# Patient Record
Sex: FEMALE | Race: WHITE | NOT HISPANIC OR LATINO | Employment: OTHER | URBAN - METROPOLITAN AREA
[De-identification: names, ages, dates, MRNs, and addresses within clinical notes are randomized per-mention and may not be internally consistent; named-entity substitution may affect disease eponyms.]

---

## 2018-08-10 ENCOUNTER — APPOINTMENT (OUTPATIENT)
Dept: SLEEP MEDICINE | Facility: HOSPITAL | Age: 68
End: 2018-08-10
Attending: INTERNAL MEDICINE

## 2018-08-21 ENCOUNTER — APPOINTMENT (OUTPATIENT)
Dept: SLEEP MEDICINE | Facility: HOSPITAL | Age: 68
End: 2018-08-21
Attending: INTERNAL MEDICINE

## 2021-08-25 ENCOUNTER — TRANSCRIBE ORDERS (OUTPATIENT)
Dept: ADMINISTRATIVE | Facility: HOSPITAL | Age: 71
End: 2021-08-25

## 2021-08-25 DIAGNOSIS — R10.9 ABDOMINAL PAIN, UNSPECIFIED ABDOMINAL LOCATION: Primary | ICD-10-CM

## 2021-08-31 ENCOUNTER — HOSPITAL ENCOUNTER (OUTPATIENT)
Dept: NUCLEAR MEDICINE | Facility: HOSPITAL | Age: 71
Discharge: HOME | End: 2021-08-31

## 2021-08-31 DIAGNOSIS — R10.9 ABDOMINAL PAIN, UNSPECIFIED ABDOMINAL LOCATION: ICD-10-CM

## 2021-08-31 PROCEDURE — 0 TECHNETIUM TC 99M MEBROFENIN KIT: Performed by: NURSE PRACTITIONER

## 2021-08-31 PROCEDURE — 78227 HEPATOBIL SYST IMAGE W/DRUG: CPT

## 2021-08-31 PROCEDURE — A9537 TC99M MEBROFENIN: HCPCS | Performed by: NURSE PRACTITIONER

## 2021-08-31 PROCEDURE — 25010000002 SINCALIDE PER 5 MCG: Performed by: NURSE PRACTITIONER

## 2021-08-31 RX ORDER — KIT FOR THE PREPARATION OF TECHNETIUM TC 99M MEBROFENIN 45 MG/10ML
1 INJECTION, POWDER, LYOPHILIZED, FOR SOLUTION INTRAVENOUS
Status: COMPLETED | OUTPATIENT
Start: 2021-08-31 | End: 2021-08-31

## 2021-08-31 RX ADMIN — SINCALIDE 2.4 MCG: 5 INJECTION, POWDER, LYOPHILIZED, FOR SOLUTION INTRAVENOUS at 13:35

## 2021-08-31 RX ADMIN — MEBROFENIN 1 DOSE: 45 INJECTION, POWDER, LYOPHILIZED, FOR SOLUTION INTRAVENOUS at 13:02

## 2022-08-29 ENCOUNTER — TRANSCRIBE ORDERS (OUTPATIENT)
Dept: ADMINISTRATIVE | Facility: HOSPITAL | Age: 72
End: 2022-08-29

## 2022-08-29 DIAGNOSIS — E11.9 DIABETES MELLITUS WITHOUT COMPLICATION: Primary | ICD-10-CM

## 2022-09-06 ENCOUNTER — TRANSCRIBE ORDERS (OUTPATIENT)
Dept: NUTRITION | Facility: HOSPITAL | Age: 72
End: 2022-09-06

## 2022-09-06 DIAGNOSIS — E11.9 DIABETES MELLITUS WITHOUT COMPLICATION: Primary | ICD-10-CM

## 2022-09-15 ENCOUNTER — APPOINTMENT (OUTPATIENT)
Dept: NUTRITION | Facility: HOSPITAL | Age: 72
End: 2022-09-15

## 2022-12-08 ENCOUNTER — OFFICE VISIT (OUTPATIENT)
Dept: INTERNAL MEDICINE | Facility: CLINIC | Age: 72
End: 2022-12-08

## 2022-12-08 VITALS
OXYGEN SATURATION: 98 % | DIASTOLIC BLOOD PRESSURE: 96 MMHG | TEMPERATURE: 97.8 F | HEART RATE: 54 BPM | BODY MASS INDEX: 40.21 KG/M2 | SYSTOLIC BLOOD PRESSURE: 144 MMHG | HEIGHT: 66 IN | WEIGHT: 250.2 LBS

## 2022-12-08 DIAGNOSIS — E03.9 ACQUIRED HYPOTHYROIDISM: ICD-10-CM

## 2022-12-08 DIAGNOSIS — R30.0 DYSURIA: ICD-10-CM

## 2022-12-08 DIAGNOSIS — E78.2 MIXED HYPERLIPIDEMIA: ICD-10-CM

## 2022-12-08 DIAGNOSIS — J30.9 ALLERGIC RHINITIS, UNSPECIFIED SEASONALITY, UNSPECIFIED TRIGGER: ICD-10-CM

## 2022-12-08 DIAGNOSIS — E66.01 CLASS 3 SEVERE OBESITY WITH SERIOUS COMORBIDITY AND BODY MASS INDEX (BMI) OF 40.0 TO 44.9 IN ADULT, UNSPECIFIED OBESITY TYPE: Primary | ICD-10-CM

## 2022-12-08 DIAGNOSIS — R79.9 ABNORMAL FINDING OF BLOOD CHEMISTRY, UNSPECIFIED: ICD-10-CM

## 2022-12-08 DIAGNOSIS — L57.0 ACTINIC KERATOSIS: ICD-10-CM

## 2022-12-08 DIAGNOSIS — I10 PRIMARY HYPERTENSION: ICD-10-CM

## 2022-12-08 DIAGNOSIS — R06.02 SHORTNESS OF BREATH: ICD-10-CM

## 2022-12-08 DIAGNOSIS — M1A.9XX0 CHRONIC GOUT INVOLVING TOE WITHOUT TOPHUS, UNSPECIFIED CAUSE, UNSPECIFIED LATERALITY: ICD-10-CM

## 2022-12-08 PROCEDURE — 99204 OFFICE O/P NEW MOD 45 MIN: CPT | Performed by: INTERNAL MEDICINE

## 2022-12-08 RX ORDER — FLUTICASONE PROPIONATE 50 MCG
1 SPRAY, SUSPENSION (ML) NASAL DAILY
Qty: 15.8 ML | Refills: 3 | Status: SHIPPED | OUTPATIENT
Start: 2022-12-08

## 2022-12-08 RX ORDER — FLUTICASONE PROPIONATE 50 MCG
SPRAY, SUSPENSION (ML) NASAL
COMMUNITY
Start: 2022-12-07 | End: 2022-12-08 | Stop reason: SDUPTHER

## 2022-12-08 NOTE — PROGRESS NOTES
"Chief Complaint  Establish Care (Just finished antibiotics for asthma. Would like to do blood panel and start fresh with medications. She is fasting for labs today. )    Subjective        Judith Tenorio presents to [facility]  History of Present Illness     Ms. Tenorio is a nellie 72-year-old female who presents to establish care and discuss concerns about gout, diabetes, COPD diagnosis.  She comes in with her son today who I also have the pleasure of seeing in clinic.  He helps provide history throughout the visit.    Has OA and gout.  Green mold.  49 yo was diagnosed with asthma and got put on a bunch of allergy meds.      Last A1c I can see was 5.6% in 2020.    Restoration--does not want blood transfusions.      Abdominal pathology and concerns: Bladder tack surgery in the past, Diverticulitis diagnosis, symptoms of prolapse of uterus noted today    Objective   Vital Signs:  /96 (BP Location: Left arm)   Pulse 54   Temp 97.8 °F (36.6 °C) (Infrared)   Ht 166.4 cm (65.5\")   Wt 113 kg (250 lb 3.2 oz)   SpO2 98%   BMI 41.00 kg/m²   Estimated body mass index is 41 kg/m² as calculated from the following:    Height as of this encounter: 166.4 cm (65.5\").    Weight as of this encounter: 113 kg (250 lb 3.2 oz).    Class 3 Severe Obesity (BMI >=40). Obesity-related health conditions include the following: hypertension, diabetes mellitus and dyslipidemias. Obesity is unchanged. BMI is is above average; BMI management plan is completed. We discussed portion control, increasing exercise and pharmacologic options including ozempic .      Physical Exam  Vitals reviewed.   Constitutional:       General: She is not in acute distress.     Appearance: Normal appearance.   HENT:      Head: Normocephalic and atraumatic.      Nose: Nose normal.      Mouth/Throat:      Mouth: Mucous membranes are moist.   Eyes:      Conjunctiva/sclera: Conjunctivae normal.   Cardiovascular:     "  Rate and Rhythm: Normal rate and regular rhythm.      Pulses: Normal pulses.      Heart sounds: Normal heart sounds.   Pulmonary:      Effort: Pulmonary effort is normal.      Breath sounds: Normal breath sounds.   Abdominal:      Palpations: Abdomen is soft.      Tenderness: There is no abdominal tenderness.   Musculoskeletal:      Right lower leg: Edema present.      Left lower leg: Edema present.   Skin:     General: Skin is warm and dry.   Neurological:      General: No focal deficit present.      Mental Status: She is alert.   Psychiatric:         Mood and Affect: Mood normal.        Result Review :  The following data was reviewed by: Hayde Bingham MD on 12/08/2022:  Common labs    Common Labs 12/8/22 12/8/22 12/8/22 12/8/22 12/8/22    1419 1419 1419 1419 1419   Glucose 134 (A)       BUN 22       Creatinine 0.94       Sodium 139       Potassium 4.4       Chloride 104       Calcium 9.3       Total Protein 6.4       Albumin 4.20       Total Bilirubin 0.4       Alkaline Phosphatase 113       AST (SGOT) 24       ALT (SGPT) 15       WBC    6.86    Hemoglobin    13.5    Hematocrit    39.1    Platelets    167    Total Cholesterol  227 (A)      Triglycerides  150      HDL Cholesterol  54      LDL Cholesterol   146 (A)      Hemoglobin A1C   6.30 (A)     Uric Acid     4.8   (A) Abnormal value       Comments are available for some flowsheets but are not being displayed.           Data reviewed: reviewed labs from 2020 and 2021 in [facility] as well as most recent [facility] note.          Assessment and Plan   Diagnoses and all orders for this visit:    1. Class 3 severe obesity with serious comorbidity and body mass index (BMI) of 40.0 to 44.9 in adult, unspecified obesity type (HCC) (Primary)  Assessment & Plan:  Struggling pretty significantly with weight in recent years.  Interested in options that might be available.  We talked a little bit about dietary changes and movement changes she could make today as well as  the possibility of Ozempic given that she is diabetic.    Orders:  -     Comprehensive Metabolic Panel  -     Hemoglobin A1c  -     CBC & Differential  -     T4, Free  -     TSH    2. Mixed hyperlipidemia  Assessment & Plan:  Previous history of mixed hyperlipidemia.  Check lipids today for cardiovascular risk screening.    Orders:  -     Lipid Panel With LDL / HDL Ratio  -     CBC & Differential    3. Primary hypertension  Assessment & Plan:  Blood pressure is borderline today and family would like to track at home before we initiate any kind of new agent.  I will follow-up with them the next time we see her and continue to follow her blood pressure closely.    Orders:  -     CBC & Differential  -     T4, Free  -     TSH    4. Abnormal finding of blood chemistry, unspecified  -     Hemoglobin A1c    5. Acquired hypothyroidism  Assessment & Plan:  Sounds to be well controlled lab testing today.      6. Actinic keratosis  Assessment & Plan:  Referred to dermatology today for treatment and body scan    Orders:  -     Ambulatory Referral to Dermatology    7. Allergic rhinitis, unspecified seasonality, unspecified trigger  Assessment & Plan:  Seems to have allergic rhinitis symptoms.  Trial of Flonase sent to pharmacy.    Orders:  -     fluticasone (FLONASE) 50 MCG/ACT nasal spray; 1 spray into the nostril(s) as directed by provider Daily.  Dispense: 15.8 mL; Refill: 3    8. Chronic gout involving toe without tophus, unspecified cause, unspecified laterality  Assessment & Plan:  Her son is also concerned about missed diagnosis of gout.  It is difficult for me to tell at this time as I only have 1 uric acid in the system and it is 6.2 from several years ago.  She and her son believe that she was not on any therapy at that time.  It does sound as though she has gouty attacks and she has managed on colchicine with allopurinol 100 mg.  I think it is reasonable to take her up to 300 mg as it looks like her kidney function  has been stable.  She hopefully could come off colchicine with this regimen change.    Orders:  -     Uric Acid    9. Shortness of breath  Assessment & Plan:  Her son has multiple concerns today that perhaps she was not diagnosed appropriately in the past.  She has frequent shortness of breath and it sounds as though this is something she has dealt with her entire life, but that she had significant worsening around age 48 years old.  Around that time she was diagnosed with COPD, but has never smoked herself.  She does have some secondhand smoke exposure history, but is difficult to determine at this time if it would be enough to give her COPD.  PFTs and chest x-ray ordered today for initial evaluation.    Orders:  -     Pulmonary Function Test  -     XR Chest PA & Lateral    10. Dysuria  -     POC Urinalysis Dipstick, Automated    Other orders  -     Uric Acid           Follow Up   Return in about 1 month (around 1/8/2023) for f/u T2DM, HTN, HLD, OA.  Patient was given instructions and counseling regarding her condition or for health maintenance advice. Please see specific information pulled into the AVS if appropriate.

## 2022-12-09 LAB
ALBUMIN SERPL-MCNC: 4.2 G/DL (ref 3.5–5.2)
ALBUMIN/GLOB SERPL: 1.9 G/DL
ALP SERPL-CCNC: 113 U/L (ref 39–117)
ALT SERPL-CCNC: 15 U/L (ref 1–33)
AST SERPL-CCNC: 24 U/L (ref 1–32)
BASOPHILS # BLD AUTO: 0.07 10*3/MM3 (ref 0–0.2)
BASOPHILS NFR BLD AUTO: 1 % (ref 0–1.5)
BILIRUB SERPL-MCNC: 0.4 MG/DL (ref 0–1.2)
BUN SERPL-MCNC: 22 MG/DL (ref 8–23)
BUN/CREAT SERPL: 23.4 (ref 7–25)
CALCIUM SERPL-MCNC: 9.3 MG/DL (ref 8.6–10.5)
CHLORIDE SERPL-SCNC: 104 MMOL/L (ref 98–107)
CHOLEST SERPL-MCNC: 227 MG/DL (ref 0–200)
CO2 SERPL-SCNC: 25 MMOL/L (ref 22–29)
CREAT SERPL-MCNC: 0.94 MG/DL (ref 0.57–1)
EGFRCR SERPLBLD CKD-EPI 2021: 63.8 ML/MIN/1.73
EOSINOPHIL # BLD AUTO: 0.27 10*3/MM3 (ref 0–0.4)
EOSINOPHIL NFR BLD AUTO: 3.9 % (ref 0.3–6.2)
ERYTHROCYTE [DISTWIDTH] IN BLOOD BY AUTOMATED COUNT: 13.9 % (ref 12.3–15.4)
GLOBULIN SER CALC-MCNC: 2.2 GM/DL
GLUCOSE SERPL-MCNC: 134 MG/DL (ref 65–99)
HBA1C MFR BLD: 6.3 % (ref 4.8–5.6)
HCT VFR BLD AUTO: 39.1 % (ref 34–46.6)
HDLC SERPL-MCNC: 54 MG/DL (ref 40–60)
HGB BLD-MCNC: 13.5 G/DL (ref 12–15.9)
IMM GRANULOCYTES # BLD AUTO: 0.04 10*3/MM3 (ref 0–0.05)
IMM GRANULOCYTES NFR BLD AUTO: 0.6 % (ref 0–0.5)
LDLC SERPL CALC-MCNC: 146 MG/DL (ref 0–100)
LDLC/HDLC SERPL: 2.65 {RATIO}
LYMPHOCYTES # BLD AUTO: 1.61 10*3/MM3 (ref 0.7–3.1)
LYMPHOCYTES NFR BLD AUTO: 23.5 % (ref 19.6–45.3)
MCH RBC QN AUTO: 31.9 PG (ref 26.6–33)
MCHC RBC AUTO-ENTMCNC: 34.5 G/DL (ref 31.5–35.7)
MCV RBC AUTO: 92.4 FL (ref 79–97)
MONOCYTES # BLD AUTO: 0.61 10*3/MM3 (ref 0.1–0.9)
MONOCYTES NFR BLD AUTO: 8.9 % (ref 5–12)
NEUTROPHILS # BLD AUTO: 4.26 10*3/MM3 (ref 1.7–7)
NEUTROPHILS NFR BLD AUTO: 62.1 % (ref 42.7–76)
NRBC BLD AUTO-RTO: 0 /100 WBC (ref 0–0.2)
PLATELET # BLD AUTO: 167 10*3/MM3 (ref 140–450)
POTASSIUM SERPL-SCNC: 4.4 MMOL/L (ref 3.5–5.2)
PROT SERPL-MCNC: 6.4 G/DL (ref 6–8.5)
RBC # BLD AUTO: 4.23 10*6/MM3 (ref 3.77–5.28)
SODIUM SERPL-SCNC: 139 MMOL/L (ref 136–145)
T4 FREE SERPL-MCNC: 0.95 NG/DL (ref 0.93–1.7)
TRIGL SERPL-MCNC: 150 MG/DL (ref 0–150)
TSH SERPL DL<=0.005 MIU/L-ACNC: 5.9 UIU/ML (ref 0.27–4.2)
URATE SERPL-MCNC: 4.8 MG/DL (ref 2.4–5.7)
VLDLC SERPL CALC-MCNC: 27 MG/DL (ref 5–40)
WBC # BLD AUTO: 6.86 10*3/MM3 (ref 3.4–10.8)

## 2022-12-12 PROBLEM — E66.813 CLASS 3 SEVERE OBESITY WITH SERIOUS COMORBIDITY AND BODY MASS INDEX (BMI) OF 40.0 TO 44.9 IN ADULT: Status: ACTIVE | Noted: 2022-12-12

## 2022-12-12 PROBLEM — E03.9 ACQUIRED HYPOTHYROIDISM: Status: ACTIVE | Noted: 2022-12-12

## 2022-12-12 PROBLEM — J30.9 ALLERGIC RHINITIS: Status: ACTIVE | Noted: 2022-12-12

## 2022-12-12 PROBLEM — L57.0 ACTINIC KERATOSIS: Status: ACTIVE | Noted: 2022-12-12

## 2022-12-12 PROBLEM — I10 PRIMARY HYPERTENSION: Status: ACTIVE | Noted: 2022-12-12

## 2022-12-12 PROBLEM — R30.0 DYSURIA: Status: ACTIVE | Noted: 2022-12-12

## 2022-12-12 PROBLEM — E66.01 CLASS 3 SEVERE OBESITY WITH SERIOUS COMORBIDITY AND BODY MASS INDEX (BMI) OF 40.0 TO 44.9 IN ADULT: Status: ACTIVE | Noted: 2022-12-12

## 2022-12-12 PROBLEM — E78.2 MIXED HYPERLIPIDEMIA: Status: ACTIVE | Noted: 2022-12-12

## 2022-12-12 PROBLEM — R06.02 SHORTNESS OF BREATH: Status: ACTIVE | Noted: 2022-12-12

## 2022-12-12 PROBLEM — M1A.9XX0 CHRONIC GOUT INVOLVING TOE WITHOUT TOPHUS: Status: ACTIVE | Noted: 2022-12-12

## 2022-12-12 NOTE — ASSESSMENT & PLAN NOTE
Blood pressure is borderline today and family would like to track at home before we initiate any kind of new agent.  I will follow-up with them the next time we see her and continue to follow her blood pressure closely.

## 2022-12-12 NOTE — ASSESSMENT & PLAN NOTE
Struggling pretty significantly with weight in recent years.  Interested in options that might be available.  We talked a little bit about dietary changes and movement changes she could make today as well as the possibility of Ozempic given that she is diabetic.

## 2022-12-12 NOTE — ASSESSMENT & PLAN NOTE
Her son is also concerned about missed diagnosis of gout.  It is difficult for me to tell at this time as I only have 1 uric acid in the system and it is 6.2 from several years ago.  She and her son believe that she was not on any therapy at that time.  It does sound as though she has gouty attacks and she has managed on colchicine with allopurinol 100 mg.  I think it is reasonable to take her up to 300 mg as it looks like her kidney function has been stable.  She hopefully could come off colchicine with this regimen change.

## 2022-12-21 ENCOUNTER — HOSPITAL ENCOUNTER (OUTPATIENT)
Dept: PULMONOLOGY | Facility: HOSPITAL | Age: 72
Discharge: HOME | End: 2022-12-21
Admitting: INTERNAL MEDICINE

## 2022-12-21 VITALS — OXYGEN SATURATION: 97 % | RESPIRATION RATE: 20 BRPM | HEART RATE: 74 BPM

## 2022-12-21 PROCEDURE — 94729 DIFFUSING CAPACITY: CPT

## 2022-12-21 PROCEDURE — 63710000001 ALBUTEROL SULFATE HFA 108 (90 BASE) MCG/ACT AEROSOL SOLUTION 8 G INHALER: Performed by: INTERNAL MEDICINE

## 2022-12-21 PROCEDURE — 94726 PLETHYSMOGRAPHY LUNG VOLUMES: CPT

## 2022-12-21 PROCEDURE — 94060 EVALUATION OF WHEEZING: CPT

## 2022-12-21 PROCEDURE — A9270 NON-COVERED ITEM OR SERVICE: HCPCS | Performed by: INTERNAL MEDICINE

## 2022-12-21 RX ORDER — ALBUTEROL SULFATE 90 UG/1
4 AEROSOL, METERED RESPIRATORY (INHALATION) ONCE
Status: COMPLETED | OUTPATIENT
Start: 2022-12-21 | End: 2022-12-21

## 2022-12-21 RX ADMIN — ALBUTEROL SULFATE 4 PUFF: 90 AEROSOL, METERED RESPIRATORY (INHALATION) at 14:04

## 2022-12-23 NOTE — PROGRESS NOTES
Tried to call patient to discuss this but was unable to reach and just sent the patient a message via [employer] about getting scheduled.

## 2022-12-29 ENCOUNTER — OFFICE VISIT (OUTPATIENT)
Dept: INTERNAL MEDICINE | Facility: CLINIC | Age: 72
End: 2022-12-29
Payer: MEDICARE

## 2022-12-29 ENCOUNTER — HOSPITAL ENCOUNTER (OUTPATIENT)
Dept: GENERAL RADIOLOGY | Facility: HOSPITAL | Age: 72
Discharge: HOME | End: 2022-12-29
Admitting: INTERNAL MEDICINE

## 2022-12-29 VITALS
HEIGHT: 66 IN | DIASTOLIC BLOOD PRESSURE: 82 MMHG | OXYGEN SATURATION: 98 % | SYSTOLIC BLOOD PRESSURE: 140 MMHG | BODY MASS INDEX: 40.02 KG/M2 | TEMPERATURE: 98 F | HEART RATE: 87 BPM | WEIGHT: 249 LBS

## 2022-12-29 DIAGNOSIS — N39.498 OTHER URINARY INCONTINENCE: Primary | ICD-10-CM

## 2022-12-29 DIAGNOSIS — R06.02 SHORTNESS OF BREATH: ICD-10-CM

## 2022-12-29 DIAGNOSIS — E78.2 MIXED HYPERLIPIDEMIA: ICD-10-CM

## 2022-12-29 DIAGNOSIS — E03.9 ACQUIRED HYPOTHYROIDISM: ICD-10-CM

## 2022-12-29 LAB
BILIRUB BLD-MCNC: ABNORMAL MG/DL
CLARITY, POC: CLEAR
COLOR UR: ABNORMAL
EXPIRATION DATE: ABNORMAL
GLUCOSE UR STRIP-MCNC: NEGATIVE MG/DL
KETONES UR QL: ABNORMAL
LEUKOCYTE EST, POC: NEGATIVE
Lab: ABNORMAL
NITRITE UR-MCNC: NEGATIVE MG/ML
PH UR: 5.5 [PH] (ref 5–8)
PROT UR STRIP-MCNC: NEGATIVE MG/DL
RBC # UR STRIP: NEGATIVE /UL
SP GR UR: 1.02 (ref 1–1.03)
UROBILINOGEN UR QL: NORMAL

## 2022-12-29 PROCEDURE — 71046 X-RAY EXAM CHEST 2 VIEWS: CPT

## 2022-12-29 PROCEDURE — 1160F RVW MEDS BY RX/DR IN RCRD: CPT | Performed by: INTERNAL MEDICINE

## 2022-12-29 PROCEDURE — 81003 URINALYSIS AUTO W/O SCOPE: CPT | Performed by: INTERNAL MEDICINE

## 2022-12-29 PROCEDURE — 1159F MED LIST DOCD IN RCRD: CPT | Performed by: INTERNAL MEDICINE

## 2022-12-29 PROCEDURE — 99214 OFFICE O/P EST MOD 30 MIN: CPT | Performed by: INTERNAL MEDICINE

## 2022-12-29 RX ORDER — BLOOD SUGAR DIAGNOSTIC
STRIP MISCELLANEOUS
COMMUNITY
Start: 2022-12-28

## 2022-12-29 NOTE — PROGRESS NOTES
Judith Tenorio is a 72 y.o. female who presents with a chief complaint of   Chief Complaint   Patient presents with   • Results     Discuss test results, still has a very bad cough that is worse at night       HPI     F/u today to discuss results and make a plan going forward.     Thyroid was a little abnormal, but she hasn't been taking her Levothyroxine.  Will try to get back on her Levothyroxine.     Has some white matter disease in brain from prior MRI.  Let me know today while we were discussing labs.     The following portions of the patient's history were reviewed and updated as appropriate: allergies, current medications, past family history, past medical history, past social history, past surgical history and problem list.      Current Outpatient Medications:   •  allopurinol (ZYLOPRIM) 300 MG tablet, Take 300 mg by mouth Daily., Disp: , Rfl:   •  cetirizine (zyrTEC) 10 MG tablet, Take 10 mg by mouth Daily., Disp: , Rfl:   •  fluticasone (FLONASE) 50 MCG/ACT nasal spray, 1 spray into the nostril(s) as directed by provider Daily., Disp: 15.8 mL, Rfl: 3  •  metFORMIN (GLUCOPHAGE) 500 MG tablet, Take 1 tablet by mouth Every 12 (Twelve) Hours., Disp: , Rfl:   •  montelukast (SINGULAIR) 10 MG tablet, Take 1 tablet by mouth Daily., Disp: , Rfl:   •  Symbicort 160-4.5 MCG/ACT inhaler, Inhale 1 puff 2 (Two) Times a Day., Disp: , Rfl:   •  Ventolin  (90 Base) MCG/ACT inhaler, Inhale 1 puff As Needed., Disp: , Rfl:   •  Accu-Chek Guide test strip, , Disp: , Rfl:   •  colchicine 0.6 MG tablet, Take 1 tablet by mouth 2 (Two) Times a Day., Disp: , Rfl:             Physical Exam  /82 (BP Location: Left arm)   Pulse 87   Temp 98 °F (36.7 °C) (Infrared)   Ht 166.4 cm (65.5\")   Wt 113 kg (249 lb)   SpO2 98%   BMI 40.81 kg/m²     Physical Exam  Vitals reviewed.   Constitutional:       General: She is not in acute distress.     Appearance: Normal appearance.   HENT:      Head: Normocephalic  and atraumatic.      Nose: Nose normal.      Mouth/Throat:      Mouth: Mucous membranes are moist.   Eyes:      Conjunctiva/sclera: Conjunctivae normal.   Cardiovascular:      Rate and Rhythm: Normal rate and regular rhythm.      Pulses: Normal pulses.      Heart sounds: Normal heart sounds.   Pulmonary:      Effort: Pulmonary effort is normal.      Breath sounds: Normal breath sounds.   Abdominal:      Palpations: Abdomen is soft.      Tenderness: There is no abdominal tenderness.   Skin:     General: Skin is warm and dry.   Neurological:      General: No focal deficit present.      Mental Status: She is alert.   Psychiatric:         Mood and Affect: Mood normal.           Results for orders placed or performed in visit on 12/29/22   POC Urinalysis Dipstick, Automated    Specimen: Urine   Result Value Ref Range    Color Orange (A) Yellow, Straw, Dark Yellow, Adina    Clarity, UA Clear Clear    Specific Gravity  1.020 1.005 - 1.030    pH, Urine 5.5 5.0 - 8.0    Leukocytes Negative Negative    Nitrite, UA Negative Negative    Protein, POC Negative Negative mg/dL    Glucose, UA Negative Negative mg/dL    Ketones, UA Trace (A) Negative    Urobilinogen, UA Normal Normal, 0.2 E.U./dL    Bilirubin Small (1+) (A) Negative    Blood, UA Negative Negative    Lot Number [id number]     Expiration Date 9/30/23            Diagnoses and all orders for this visit:    1. Other urinary incontinence (Primary)  Assessment & Plan:  - POCT u/a today as she feels symptoms are a little worse    Orders:  -     POC Urinalysis Dipstick, Automated    2. Shortness of breath  Assessment & Plan:  - Reviewed PFTs together and explained DLCO  - Shared decision making to do echo next and re-assess from there    Orders:  -     Adult Transthoracic Echo Complete W/ Cont if Necessary Per Protocol    3. Mixed hyperlipidemia  Assessment & Plan:  - LDL is elevated, but I think it is reasonable to work on diet before starting a statin at this time.  She is  opposed to a statin at this time as well       4. Acquired hypothyroidism  Assessment & Plan:  - TSH mildly elevated, but when discussed, stated she has struggled to consistently take her synthroid, but will re-start taking it.  She says she has pills at home.       F/u after Echo completed.

## 2023-01-03 PROBLEM — R32 ABSENCE OF BLADDER CONTINENCE: Status: ACTIVE | Noted: 2023-01-03

## 2023-01-03 NOTE — ASSESSMENT & PLAN NOTE
- Reviewed PFTs together and explained DLCO  - Shared decision making to do echo next and re-assess from there

## 2023-01-03 NOTE — ASSESSMENT & PLAN NOTE
- LDL is elevated, but I think it is reasonable to work on diet before starting a statin at this time.  She is opposed to a statin at this time as well

## 2023-01-03 NOTE — ASSESSMENT & PLAN NOTE
- TSH mildly elevated, but when discussed, stated she has struggled to consistently take her synthroid, but will re-start taking it.  She says she has pills at home.

## 2023-01-04 ENCOUNTER — TELEMEDICINE (OUTPATIENT)
Dept: INTERNAL MEDICINE | Facility: CLINIC | Age: 73
End: 2023-01-04
Payer: MEDICARE

## 2023-01-04 VITALS — BODY MASS INDEX: 41.62 KG/M2 | HEIGHT: 66 IN | WEIGHT: 259 LBS

## 2023-01-04 DIAGNOSIS — R05.2 SUBACUTE COUGH: Primary | ICD-10-CM

## 2023-01-04 PROCEDURE — 1159F MED LIST DOCD IN RCRD: CPT | Performed by: INTERNAL MEDICINE

## 2023-01-04 PROCEDURE — 1160F RVW MEDS BY RX/DR IN RCRD: CPT | Performed by: INTERNAL MEDICINE

## 2023-01-04 PROCEDURE — 99213 OFFICE O/P EST LOW 20 MIN: CPT | Performed by: INTERNAL MEDICINE

## 2023-01-04 RX ORDER — FUROSEMIDE 20 MG/1
20 TABLET ORAL DAILY
Qty: 30 TABLET | Refills: 0 | Status: SHIPPED | OUTPATIENT
Start: 2023-01-04 | End: 2023-01-12

## 2023-01-04 RX ORDER — BENZONATATE 100 MG/1
100 CAPSULE ORAL 3 TIMES DAILY PRN
Qty: 42 CAPSULE | Refills: 0 | Status: SHIPPED | OUTPATIENT
Start: 2023-01-04 | End: 2023-01-18

## 2023-01-04 NOTE — ASSESSMENT & PLAN NOTE
Has been to [facility] and tried lots of OTC treatments.  CXR more consistent with volume.  Echo pending, but symptoms seem concerning for volume overload without real infectious signs.  PFTs more c/w decreased DLCO and no obstructive FEV1 or flow volume loops.  Will go ahead and treat with lasix 20 mg daily to try and pull some fluid off.  Return precautions discussed.  Echo pending on 1/10 will f/u.

## 2023-01-04 NOTE — PROGRESS NOTES
Judith Tenorio is a 72 y.o. female who presents with a chief complaint of   Chief Complaint   Patient presents with   • Cough     Has been coughing for over 2 months, has been coughing so much her stomach hurts. She says that she is coughing so much she is choking herself        HPI     2 months of just coughing so much that her stomach is sore and she is having trouble breathing.  Sputum is clear mucous.  Feels like it has been persistent and she is just so sore from coughing and getting choked. This is keeping her up at night.      The following portions of the patient's history were reviewed and updated as appropriate: allergies, current medications, past family history, past medical history, past social history, past surgical history and problem list.      Current Outpatient Medications:   •  allopurinol (ZYLOPRIM) 300 MG tablet, Take 300 mg by mouth Daily., Disp: , Rfl:   •  cetirizine (zyrTEC) 10 MG tablet, Take 10 mg by mouth Daily., Disp: , Rfl:   •  fluticasone (FLONASE) 50 MCG/ACT nasal spray, 1 spray into the nostril(s) as directed by provider Daily., Disp: 15.8 mL, Rfl: 3  •  metFORMIN (GLUCOPHAGE) 500 MG tablet, Take 1 tablet by mouth Every 12 (Twelve) Hours., Disp: , Rfl:   •  montelukast (SINGULAIR) 10 MG tablet, Take 1 tablet by mouth Daily., Disp: , Rfl:   •  Accu-Chek Guide test strip, , Disp: , Rfl:   •  colchicine 0.6 MG tablet, Take 1 tablet by mouth 2 (Two) Times a Day., Disp: , Rfl:   •  Symbicort 160-4.5 MCG/ACT inhaler, Inhale 1 puff 2 (Two) Times a Day., Disp: , Rfl:   •  Ventolin  (90 Base) MCG/ACT inhaler, Inhale 1 puff As Needed., Disp: , Rfl:             Physical Exam  Ht 166.4 cm (65.5\")   Wt 117 kg (259 lb)   BMI 42.44 kg/m²     This was a telehealth visit.  The visit was with audio only after multiple attempts at video.  The visit lasted 19 minutes.  I was present in my office at [facility] and the patient was present at her home.     Results for orders  placed or performed in visit on 12/29/22   POC Urinalysis Dipstick, Automated    Specimen: Urine   Result Value Ref Range    Color Orange (A) Yellow, Straw, Dark Yellow, Adina    Clarity, UA Clear Clear    Specific Gravity  1.020 1.005 - 1.030    pH, Urine 5.5 5.0 - 8.0    Leukocytes Negative Negative    Nitrite, UA Negative Negative    Protein, POC Negative Negative mg/dL    Glucose, UA Negative Negative mg/dL    Ketones, UA Trace (A) Negative    Urobilinogen, UA Normal Normal, 0.2 E.U./dL    Bilirubin Small (1+) (A) Negative    Blood, UA Negative Negative    Lot Number [id number]     Expiration Date 9/30/23            Diagnoses and all orders for this visit:    1. Subacute cough (Primary)  Assessment & Plan:  Has been to [facility] and tried lots of OTC treatments.  CXR more consistent with volume.  Echo pending, but symptoms seem concerning for volume overload without real infectious signs.  PFTs more c/w decreased DLCO and no obstructive FEV1 or flow volume loops.  Will go ahead and treat with lasix 20 mg daily to try and pull some fluid off.  Return precautions discussed.  Echo pending on 1/10 will f/u.

## 2023-01-10 ENCOUNTER — HOSPITAL ENCOUNTER (OUTPATIENT)
Dept: CARDIOLOGY | Facility: HOSPITAL | Age: 73
Discharge: HOME | End: 2023-01-10
Admitting: INTERNAL MEDICINE
Payer: MEDICARE

## 2023-01-10 VITALS
HEIGHT: 65 IN | DIASTOLIC BLOOD PRESSURE: 84 MMHG | WEIGHT: 259 LBS | HEART RATE: 80 BPM | BODY MASS INDEX: 43.15 KG/M2 | SYSTOLIC BLOOD PRESSURE: 140 MMHG

## 2023-01-10 LAB
AORTIC ARCH: 2.6 CM
ASCENDING AORTA: 3.1 CM
BH CV ECHO MEAS - ACS: 1.64 CM
BH CV ECHO MEAS - AO MAX PG: 4.1 MMHG
BH CV ECHO MEAS - AO MEAN PG: 2.26 MMHG
BH CV ECHO MEAS - AO ROOT DIAM: 3.5 CM
BH CV ECHO MEAS - AO V2 MAX: 101.1 CM/SEC
BH CV ECHO MEAS - AO V2 VTI: 16.3 CM
BH CV ECHO MEAS - AVA(I,D): 3 CM2
BH CV ECHO MEAS - EDV(CUBED): 41.6 ML
BH CV ECHO MEAS - EDV(MOD-SP2): 31 ML
BH CV ECHO MEAS - EDV(MOD-SP4): 29 ML
BH CV ECHO MEAS - EF(MOD-BP): 60.9 %
BH CV ECHO MEAS - EF(MOD-SP2): 64.5 %
BH CV ECHO MEAS - EF(MOD-SP4): 58.6 %
BH CV ECHO MEAS - ESV(CUBED): 9.8 ML
BH CV ECHO MEAS - ESV(MOD-SP2): 11 ML
BH CV ECHO MEAS - ESV(MOD-SP4): 12 ML
BH CV ECHO MEAS - FS: 38.2 %
BH CV ECHO MEAS - IVS/LVPW: 1.07 CM
BH CV ECHO MEAS - IVSD: 1.15 CM
BH CV ECHO MEAS - LAT PEAK E' VEL: 5.9 CM/SEC
BH CV ECHO MEAS - LV DIASTOLIC VOL/BSA (35-75): 13.1 CM2
BH CV ECHO MEAS - LV MASS(C)D: 118.6 GRAMS
BH CV ECHO MEAS - LV MAX PG: 3.7 MMHG
BH CV ECHO MEAS - LV MEAN PG: 1.69 MMHG
BH CV ECHO MEAS - LV SYSTOLIC VOL/BSA (12-30): 5.4 CM2
BH CV ECHO MEAS - LV V1 MAX: 96.6 CM/SEC
BH CV ECHO MEAS - LV V1 VTI: 15.5 CM
BH CV ECHO MEAS - LVIDD: 3.5 CM
BH CV ECHO MEAS - LVIDS: 2.14 CM
BH CV ECHO MEAS - LVOT AREA: 3.1 CM2
BH CV ECHO MEAS - LVOT DIAM: 2 CM
BH CV ECHO MEAS - LVPWD: 1.07 CM
BH CV ECHO MEAS - MED PEAK E' VEL: 8.5 CM/SEC
BH CV ECHO MEAS - MV A DUR: 0.08 SEC
BH CV ECHO MEAS - MV A MAX VEL: 81.8 CM/SEC
BH CV ECHO MEAS - MV DEC TIME: 0.14 MSEC
BH CV ECHO MEAS - MV E MAX VEL: 39.3 CM/SEC
BH CV ECHO MEAS - MV E/A: 0.48
BH CV ECHO MEAS - MV MAX PG: 6.1 MMHG
BH CV ECHO MEAS - MV MEAN PG: 2.8 MMHG
BH CV ECHO MEAS - MV V2 VTI: 14.4 CM
BH CV ECHO MEAS - MVA(VTI): 3.4 CM2
BH CV ECHO MEAS - PA ACC TIME: 0.12 SEC
BH CV ECHO MEAS - PA PR(ACCEL): 26.7 MMHG
BH CV ECHO MEAS - PA V2 MAX: 85 CM/SEC
BH CV ECHO MEAS - RV MAX PG: 2.44 MMHG
BH CV ECHO MEAS - RV V1 MAX: 78.1 CM/SEC
BH CV ECHO MEAS - RV V1 VTI: 13.9 CM
BH CV ECHO MEAS - SI(MOD-SP2): 9.1 ML/M2
BH CV ECHO MEAS - SI(MOD-SP4): 7.7 ML/M2
BH CV ECHO MEAS - SUP REN AO DIAM: 2.5 CM
BH CV ECHO MEAS - SV(LVOT): 48.5 ML
BH CV ECHO MEAS - SV(MOD-SP2): 20 ML
BH CV ECHO MEAS - SV(MOD-SP4): 17 ML
BH CV ECHO MEAS - TAPSE (>1.6): 1.97 CM
BH CV ECHO MEASUREMENTS AVERAGE E/E' RATIO: 5.46
BH CV XLRA - RV BASE: 2.41 CM
BH CV XLRA - RV LENGTH: 6.9 CM
BH CV XLRA - RV MID: 1.77 CM
BH CV XLRA - TDI S': 14.6 CM/SEC
LEFT ATRIUM VOLUME INDEX: 6.2 ML/M2
MAXIMAL PREDICTED HEART RATE: 146 BPM
SINUS: 3.1 CM
STJ: 3.1 CM
STRESS TARGET HR: 124 BPM

## 2023-01-10 PROCEDURE — 93306 TTE W/DOPPLER COMPLETE: CPT

## 2023-01-10 PROCEDURE — 93306 TTE W/DOPPLER COMPLETE: CPT | Performed by: INTERNAL MEDICINE

## 2023-01-12 ENCOUNTER — OFFICE VISIT (OUTPATIENT)
Dept: INTERNAL MEDICINE | Facility: CLINIC | Age: 73
End: 2023-01-12
Payer: MEDICARE

## 2023-01-12 VITALS
SYSTOLIC BLOOD PRESSURE: 132 MMHG | HEIGHT: 65 IN | TEMPERATURE: 97.5 F | OXYGEN SATURATION: 96 % | BODY MASS INDEX: 40.75 KG/M2 | HEART RATE: 80 BPM | DIASTOLIC BLOOD PRESSURE: 82 MMHG | WEIGHT: 244.6 LBS

## 2023-01-12 DIAGNOSIS — E11.9 TYPE 2 DIABETES MELLITUS WITHOUT COMPLICATION, WITHOUT LONG-TERM CURRENT USE OF INSULIN: ICD-10-CM

## 2023-01-12 DIAGNOSIS — R05.2 SUBACUTE COUGH: ICD-10-CM

## 2023-01-12 DIAGNOSIS — I10 PRIMARY HYPERTENSION: Primary | ICD-10-CM

## 2023-01-12 DIAGNOSIS — M1A.9XX0 CHRONIC GOUT INVOLVING TOE WITHOUT TOPHUS, UNSPECIFIED CAUSE, UNSPECIFIED LATERALITY: ICD-10-CM

## 2023-01-12 DIAGNOSIS — I50.32 CHRONIC DIASTOLIC (CONGESTIVE) HEART FAILURE: ICD-10-CM

## 2023-01-12 PROCEDURE — 99214 OFFICE O/P EST MOD 30 MIN: CPT | Performed by: INTERNAL MEDICINE

## 2023-01-12 RX ORDER — FUROSEMIDE 20 MG/1
20 TABLET ORAL 3 TIMES WEEKLY
Qty: 12 TABLET | Refills: 2 | Status: SHIPPED | OUTPATIENT
Start: 2023-01-12 | End: 2023-04-12

## 2023-01-12 RX ORDER — LOSARTAN POTASSIUM 25 MG/1
25 TABLET ORAL DAILY
Qty: 30 TABLET | Refills: 2 | Status: SHIPPED | OUTPATIENT
Start: 2023-01-12 | End: 2023-04-12

## 2023-01-12 RX ORDER — SEMAGLUTIDE 1.34 MG/ML
INJECTION, SOLUTION SUBCUTANEOUS
Qty: 5.5 ML | Refills: 0 | Status: SHIPPED | OUTPATIENT
Start: 2023-01-12 | End: 2023-03-03

## 2023-01-12 NOTE — PROGRESS NOTES
"      Judith Tenorio is a 72 y.o. female who presents with a chief complaint of   Chief Complaint   Patient presents with   • Results     Discuss heart ultrasound results       HPI     Has felt so much better since initiating Lasix.    Believes that maybe her arthritis pills (diclofenac?) has caused some of the fluid around her heart.    Rolator heavy duty would like a new one.     The following portions of the patient's history were reviewed and updated as appropriate: allergies, current medications, past family history, past medical history, past social history, past surgical history and problem list.      Current Outpatient Medications:   •  Accu-Chek Guide test strip, , Disp: , Rfl:   •  benzonatate (Tessalon Perles) 100 MG capsule, Take 1 capsule by mouth 3 (Three) Times a Day As Needed for Cough for up to 14 days., Disp: 42 capsule, Rfl: 0  •  cetirizine (zyrTEC) 10 MG tablet, Take 10 mg by mouth Daily., Disp: , Rfl:   •  fluticasone (FLONASE) 50 MCG/ACT nasal spray, 1 spray into the nostril(s) as directed by provider Daily., Disp: 15.8 mL, Rfl: 3  •  furosemide (Lasix) 20 MG tablet, Take 1 tablet by mouth 3 (Three) Times a Week for 90 days., Disp: 12 tablet, Rfl: 2  •  metFORMIN (GLUCOPHAGE) 500 MG tablet, Take 1 tablet by mouth Every 12 (Twelve) Hours., Disp: , Rfl:   •  losartan (Cozaar) 25 MG tablet, Take 1 tablet by mouth Daily for 90 days., Disp: 30 tablet, Rfl: 2  •  Semaglutide,0.25 or 0.5MG/DOS, (Ozempic, 0.25 or 0.5 MG/DOSE,) 2 MG/1.5ML solution pen-injector, Inject 0.25 mg under the skin into the appropriate area as directed 1 (One) Time Per Week for 30 days, THEN 0.5 mg 1 (One) Time Per Week for 30 days, THEN 1 mg 1 (One) Time Per Week for 30 days., Disp: 5.5 mL, Rfl: 0            Physical Exam  /82 (BP Location: Left arm)   Pulse 80   Temp 97.5 °F (36.4 °C) (Infrared)   Ht 165.1 cm (65\")   Wt 111 kg (244 lb 9.6 oz)   SpO2 96%   BMI 40.70 kg/m²     Physical Exam  Vitals " reviewed.   Constitutional:       General: She is not in acute distress.     Appearance: Normal appearance.   HENT:      Head: Normocephalic and atraumatic.      Nose: Nose normal.      Mouth/Throat:      Mouth: Mucous membranes are moist.   Eyes:      Conjunctiva/sclera: Conjunctivae normal.   Cardiovascular:      Rate and Rhythm: Normal rate and regular rhythm.      Pulses: Normal pulses.      Heart sounds: Normal heart sounds.   Pulmonary:      Effort: Pulmonary effort is normal.      Breath sounds: Normal breath sounds.   Abdominal:      Palpations: Abdomen is soft.      Tenderness: There is no abdominal tenderness.   Musculoskeletal:      Right lower leg: No edema.      Left lower leg: No edema.   Skin:     General: Skin is warm and dry.   Neurological:      General: No focal deficit present.      Mental Status: She is alert.   Psychiatric:         Mood and Affect: Mood normal.           Results for orders placed or performed in visit on 12/29/22   POC Urinalysis Dipstick, Automated    Specimen: Urine   Result Value Ref Range    Color Orange (A) Yellow, Straw, Dark Yellow, Adina    Clarity, UA Clear Clear    Specific Gravity  1.020 1.005 - 1.030    pH, Urine 5.5 5.0 - 8.0    Leukocytes Negative Negative    Nitrite, UA Negative Negative    Protein, POC Negative Negative mg/dL    Glucose, UA Negative Negative mg/dL    Ketones, UA Trace (A) Negative    Urobilinogen, UA Normal Normal, 0.2 E.U./dL    Bilirubin Small (1+) (A) Negative    Blood, UA Negative Negative    Lot Number [id number]     Expiration Date 9/30/23    Adult Transthoracic Echo Complete W/ Cont if Necessary Per Protocol   Result Value Ref Range    Target HR (85%) 124 bpm    Max. Pred. HR (100%) 146 bpm    EF(MOD-bp) 60.9 %    LVIDd 3.5 cm    LVIDs 2.14 cm    IVSd 1.15 cm    LVPWd 1.07 cm    FS 38.2 %    IVS/LVPW 1.07 cm    ESV(cubed) 9.8 ml    LV Sys Vol (BSA corrected) 5.4 cm2    EDV(cubed) 41.6 ml    LV Macias Vol (BSA corrected) 13.1 cm2    LVOT area  3.1 cm2    LV mass(C)d 118.6 grams    LVOT diam 2.00 cm    EDV(MOD-sp2) 31.0 ml    EDV(MOD-sp4) 29.0 ml    ESV(MOD-sp2) 11.0 ml    ESV(MOD-sp4) 12.0 ml    SV(MOD-sp2) 20.0 ml    SV(MOD-sp4) 17.0 ml    SI(MOD-sp2) 9.1 ml/m2    SI(MOD-sp4) 7.7 ml/m2    EF(MOD-sp2) 64.5 %    EF(MOD-sp4) 58.6 %    MV E max po 39.3 cm/sec    MV A max po 81.8 cm/sec    MV dec time 0.14 msec    MV E/A 0.48     MV A dur 0.08 sec    LA ESV Index (BP) 6.2 ml/m2    Med Peak E' Po 8.5 cm/sec    Lat Peak E' Po 5.9 cm/sec    Avg E/e' ratio 5.46     SV(LVOT) 48.5 ml    RV Base 2.41 cm    RV Mid 1.77 cm    RV Length 6.9 cm    TAPSE (>1.6) 1.97 cm    RV S' 14.6 cm/sec    LV V1 max 96.6 cm/sec    LV V1 max PG 3.7 mmHg    LV V1 mean PG 1.69 mmHg    LV V1 VTI 15.5 cm    Ao pk po 101.1 cm/sec    Ao max PG 4.1 mmHg    Ao mean PG 2.26 mmHg    Ao V2 VTI 16.3 cm    SANDRA(I,D) 3.0 cm2    MV max PG 6.1 mmHg    MV mean PG 2.8 mmHg    MV V2 VTI 14.4 cm    MVA(VTI) 3.4 cm2    RV V1 max PG 2.44 mmHg    RV V1 max 78.1 cm/sec    RV V1 VTI 13.9 cm    PA V2 max 85.0 cm/sec    PA acc time 0.12 sec    PA pr(Accel) 26.7 mmHg    Ao root diam 3.5 cm    ACS 1.64 cm    Sinus 3.1 cm    STJ 3.1 cm    Ascending aorta 3.1 cm    Aortic arch 2.6 cm    Abdo Ao Diam 2.5 cm           Diagnoses and all orders for this visit:    1. Primary hypertension (Primary)  Assessment & Plan:  Blood pressure persistently borderline echo with evidence of diastolic dysfunction.  I think she likely has heart failure that was treated with the initiation of the Lasix.  And that her echo largely looked normal due to having been on Lasix for about 10 days prior to it being obtained.  I think the fact that she still had some grade 1 diastolic dysfunction is evidence that she does have diastolic heart failure.  I think it is very important that I control her blood pressure closely for all of these reasons.  Discussed that with her and her son today in office and decided on initiation of losartan 25  mg.  I would like to get her on further goal-directed medical therapy and Entresto might even be an option for her at some point, but we will start with this.    Orders:  -     losartan (Cozaar) 25 MG tablet; Take 1 tablet by mouth Daily for 90 days.  Dispense: 30 tablet; Refill: 2    2. Type 2 diabetes mellitus without complication, without long-term current use of insulin (HCC)  Assessment & Plan:  Ozempic discussed and initiated today.  Her A1c is 6.3% on metformin, but I do think given her cardiovascular risks as well as her weight, that she could benefit from further A1c control as well as some mild weight loss.    Orders:  -     Semaglutide,0.25 or 0.5MG/DOS, (Ozempic, 0.25 or 0.5 MG/DOSE,) 2 MG/1.5ML solution pen-injector; Inject 0.25 mg under the skin into the appropriate area as directed 1 (One) Time Per Week for 30 days, THEN 0.5 mg 1 (One) Time Per Week for 30 days, THEN 1 mg 1 (One) Time Per Week for 30 days.  Dispense: 5.5 mL; Refill: 0    3. Subacute cough  -     furosemide (Lasix) 20 MG tablet; Take 1 tablet by mouth 3 (Three) Times a Week for 90 days.  Dispense: 12 tablet; Refill: 2    4. Chronic gout involving toe without tophus, unspecified cause, unspecified laterality  Assessment & Plan:  Difficult to tell how she obtained this diagnosis, but had normal uric acid off of allopurinol and colchicine so she would like to hold these agents for now.  I think this is okay as long as we continue to monitor her closely.      5. Chronic diastolic (congestive) heart failure (HCC)  Assessment & Plan:  NYHA class I diastolic heart failure clinically diagnosed by her impressive response to Lasix both from a coughing standpoint as well as a fluid on her lower extremities standpoint.  Also utilizing her recently obtained echo with grade 1 diastolic dysfunction despite being on 10 days of Lasix prior to this.  We will strive to get her on full goal-directed medical therapy, but we will start with losartan and  intermittent Lasix.  She has had a little bit of dry mouth and I do not want to deprive her kidneys so we will move from daily Lasix to Monday Wednesday Friday.      Answers for HPI/ROS submitted by the patient on 1/11/2023  Please describe your symptoms.: Heart test results  Have you had these symptoms before?: No  How long have you been having these symptoms?: 1-4 days  What is the primary reason for your visit?: Other

## 2023-01-13 PROBLEM — E11.9 TYPE 2 DIABETES MELLITUS WITHOUT COMPLICATION, WITHOUT LONG-TERM CURRENT USE OF INSULIN: Status: ACTIVE | Noted: 2023-01-13

## 2023-01-13 PROBLEM — I50.32 CHRONIC DIASTOLIC (CONGESTIVE) HEART FAILURE (HCC): Status: ACTIVE | Noted: 2023-01-13

## 2023-01-13 NOTE — ASSESSMENT & PLAN NOTE
NYHA class I diastolic heart failure clinically diagnosed by her impressive response to Lasix both from a coughing standpoint as well as a fluid on her lower extremities standpoint.  Also utilizing her recently obtained echo with grade 1 diastolic dysfunction despite being on 10 days of Lasix prior to this.  We will strive to get her on full goal-directed medical therapy, but we will start with losartan and intermittent Lasix.  She has had a little bit of dry mouth and I do not want to deprive her kidneys so we will move from daily Lasix to Monday Wednesday Friday.

## 2023-01-13 NOTE — ASSESSMENT & PLAN NOTE
Ozempic discussed and initiated today.  Her A1c is 6.3% on metformin, but I do think given her cardiovascular risks as well as her weight, that she could benefit from further A1c control as well as some mild weight loss.

## 2023-01-13 NOTE — ASSESSMENT & PLAN NOTE
Difficult to tell how she obtained this diagnosis, but had normal uric acid off of allopurinol and colchicine so she would like to hold these agents for now.  I think this is okay as long as we continue to monitor her closely.

## 2023-01-13 NOTE — ASSESSMENT & PLAN NOTE
Blood pressure persistently borderline echo with evidence of diastolic dysfunction.  I think she likely has heart failure that was treated with the initiation of the Lasix.  And that her echo largely looked normal due to having been on Lasix for about 10 days prior to it being obtained.  I think the fact that she still had some grade 1 diastolic dysfunction is evidence that she does have diastolic heart failure.  I think it is very important that I control her blood pressure closely for all of these reasons.  Discussed that with her and her son today in office and decided on initiation of losartan 25 mg.  I would like to get her on further goal-directed medical therapy and Entresto might even be an option for her at some point, but we will start with this.

## 2023-01-16 DIAGNOSIS — J01.90 ACUTE NON-RECURRENT SINUSITIS, UNSPECIFIED LOCATION: ICD-10-CM

## 2023-01-16 DIAGNOSIS — J40 BRONCHITIS: ICD-10-CM

## 2023-01-17 RX ORDER — DOXYCYCLINE 100 MG/1
100 CAPSULE ORAL 2 TIMES DAILY
Qty: 20 CAPSULE | Refills: 0 | OUTPATIENT
Start: 2023-01-17

## 2023-01-17 RX ORDER — BENZONATATE 200 MG/1
200 CAPSULE ORAL 3 TIMES DAILY PRN
Qty: 30 CAPSULE | Refills: 0 | OUTPATIENT
Start: 2023-01-17

## 2023-01-17 NOTE — TELEPHONE ENCOUNTER
PLEASE TAKE A LOOK AT THIS, A COUPLE OF THESE SAY THEY WERE DISCONTINUED.      Rx Refill Note  Requested Prescriptions     Pending Prescriptions Disp Refills    doxycycline (MONODOX) 100 MG capsule [Pharmacy Med Name: DOXYCYC MONO 100MG] 20 capsule 0     Sig: Take 1 capsule by mouth 2 (Two) Times a Day.    benzonatate (TESSALON) 200 MG capsule [Pharmacy Med Name: BENZONATATE 200MG] 30 capsule 0     Sig: Take 1 capsule by mouth 3 (Three) Times a Day As Needed for Cough.    cetirizine (zyrTEC) 10 MG tablet [Pharmacy Med Name: CETIRIZINE 10MG] 90 tablet 0     Sig: TAKE 1 TABLET BY MOUTH ONCE DAILY    atorvastatin (LIPITOR) 40 MG tablet [Pharmacy Med Name: ATORVASTATIN 40MG] 90 tablet 2     Sig: TAKE 1 TABLET BY MOUTH ONCE DAILY    metFORMIN (GLUCOPHAGE) 500 MG tablet [Pharmacy Med Name: METFORMIN 500MG] 180 tablet 1     Sig: TAKE 1 TABLET BY MOUTH EVERY 12 HOURS WITH FOOD    Symbicort 160-4.5 MCG/ACT inhaler [Pharmacy Med Name: SYMBICORT -4.5] 10.2 g 1     Sig: INHALE 2 PUFFS TWICE DAILY. RINSE MOUTH AFTER USE.    diclofenac (VOLTAREN) 75 MG EC tablet [Pharmacy Med Name: DICLOFENAC 75MG DR] 180 tablet 1     Sig: TAKE 1 TABLET BY MOUTH TWICE DAILY    colchicine 0.6 MG tablet [Pharmacy Med Name: COLCHICINE 0.6MG] 60 tablet 0     Sig: TAKE 1 TABLET BY MOUTH TWICE DAILY    Ventolin  (90 Base) MCG/ACT inhaler [Pharmacy Med Name: VENTOLIN HFA AER] 18 g 2     Sig: INHALE 1 TO 2 PUFFS EVERY 4 TO 6 HOURS AS NEEDED    montelukast (SINGULAIR) 10 MG tablet [Pharmacy Med Name: MONTELUKAST 10MG] 90 tablet 1     Sig: TAKE 1 TABLET BY MOUTH ONCE DAILY    budesonide (PULMICORT) 0.5 MG/2ML nebulizer solution [Pharmacy Med Name: BUDESONIDE JET 0.5MG/2] 120 mL 9     Sig: USE THE CONTENTS OF 1 VIAL IN NEBULIZER TWICE A DAY    gabapentin (NEURONTIN) 300 MG capsule [Pharmacy Med Name: GABAPENTIN 300MG] 180 capsule 0     Sig: TAKE 1 CAPSULE BY MOUTH TWICE DAILY      Last office visit with prescribing clinician: 1/12/2023   Last  telemedicine visit with prescribing clinician: 4/13/2023   Next office visit with prescribing clinician: 4/13/2023                         Would you like a call back once the refill request has been completed: [] Yes [] No    If the office needs to give you a call back, can they leave a voicemail: [] Yes [] No    Janki Ernst MA  01/17/23, 08:46 EST

## 2023-01-18 RX ORDER — COLCHICINE 0.6 MG/1
TABLET ORAL
Qty: 60 TABLET | Refills: 0 | OUTPATIENT
Start: 2023-01-18

## 2023-01-18 RX ORDER — GABAPENTIN 300 MG/1
CAPSULE ORAL
Qty: 180 CAPSULE | Refills: 0 | OUTPATIENT
Start: 2023-01-18

## 2023-01-18 RX ORDER — BUDESONIDE 0.5 MG/2ML
INHALANT ORAL
Qty: 120 ML | Refills: 9 | Status: SHIPPED | OUTPATIENT
Start: 2023-01-18

## 2023-01-18 RX ORDER — ALBUTEROL SULFATE 90 UG/1
AEROSOL, METERED RESPIRATORY (INHALATION)
Qty: 18 G | Refills: 2 | OUTPATIENT
Start: 2023-01-18

## 2023-01-18 RX ORDER — ATORVASTATIN CALCIUM 40 MG/1
TABLET, FILM COATED ORAL
Qty: 90 TABLET | Refills: 2 | Status: SHIPPED | OUTPATIENT
Start: 2023-01-18

## 2023-01-18 RX ORDER — MONTELUKAST SODIUM 10 MG/1
TABLET ORAL
Qty: 90 TABLET | Refills: 1 | Status: SHIPPED | OUTPATIENT
Start: 2023-01-18

## 2023-01-18 RX ORDER — BUDESONIDE AND FORMOTEROL FUMARATE DIHYDRATE 160; 4.5 UG/1; UG/1
AEROSOL RESPIRATORY (INHALATION)
Qty: 10.2 G | Refills: 1 | OUTPATIENT
Start: 2023-01-18

## 2023-01-18 RX ORDER — CETIRIZINE HYDROCHLORIDE 10 MG/1
TABLET ORAL
Qty: 90 TABLET | Refills: 0 | Status: SHIPPED | OUTPATIENT
Start: 2023-01-18

## 2023-01-18 RX ORDER — DICLOFENAC SODIUM 75 MG/1
TABLET, DELAYED RELEASE ORAL
Qty: 180 TABLET | Refills: 1 | Status: SHIPPED | OUTPATIENT
Start: 2023-01-18

## 2023-02-28 DIAGNOSIS — E11.9 TYPE 2 DIABETES MELLITUS WITHOUT COMPLICATION, WITHOUT LONG-TERM CURRENT USE OF INSULIN: ICD-10-CM

## 2023-03-02 ENCOUNTER — OFFICE VISIT (OUTPATIENT)
Dept: INTERNAL MEDICINE | Facility: CLINIC | Age: 73
End: 2023-03-02
Payer: MEDICARE

## 2023-03-02 VITALS
HEART RATE: 89 BPM | SYSTOLIC BLOOD PRESSURE: 132 MMHG | DIASTOLIC BLOOD PRESSURE: 84 MMHG | BODY MASS INDEX: 40.95 KG/M2 | HEIGHT: 65 IN | TEMPERATURE: 97.7 F | WEIGHT: 245.8 LBS | OXYGEN SATURATION: 98 %

## 2023-03-02 DIAGNOSIS — M79.89 TOE SWELLING: ICD-10-CM

## 2023-03-02 DIAGNOSIS — E03.9 ACQUIRED HYPOTHYROIDISM: ICD-10-CM

## 2023-03-02 DIAGNOSIS — E78.2 MIXED HYPERLIPIDEMIA: Primary | ICD-10-CM

## 2023-03-02 DIAGNOSIS — R60.0 LOWER EXTREMITY EDEMA: ICD-10-CM

## 2023-03-02 PROCEDURE — 3079F DIAST BP 80-89 MM HG: CPT | Performed by: INTERNAL MEDICINE

## 2023-03-02 PROCEDURE — 1160F RVW MEDS BY RX/DR IN RCRD: CPT | Performed by: INTERNAL MEDICINE

## 2023-03-02 PROCEDURE — 99214 OFFICE O/P EST MOD 30 MIN: CPT | Performed by: INTERNAL MEDICINE

## 2023-03-02 PROCEDURE — 3075F SYST BP GE 130 - 139MM HG: CPT | Performed by: INTERNAL MEDICINE

## 2023-03-02 RX ORDER — GLUCOSAM/CHON-MSM1/C/MANG/BOSW 500-416.6
TABLET ORAL
COMMUNITY
Start: 2023-02-10

## 2023-03-02 RX ORDER — LEVOTHYROXINE SODIUM 0.12 MG/1
TABLET ORAL
COMMUNITY
Start: 2023-01-16 | End: 2023-03-14

## 2023-03-02 RX ORDER — ALLOPURINOL 300 MG/1
TABLET ORAL
COMMUNITY
Start: 2023-02-10

## 2023-03-02 NOTE — PROGRESS NOTES
Judith Tenorio is a 72 y.o. female who presents with a chief complaint of   Chief Complaint   Patient presents with   • Diabetes     F/U on DM med  Pt brought handicap paperwork for pcp to complete   • Constipation     She thinks the cholesterol med is causing this       HPI     Lower extremities swollen again with some wheezing and cough similar to how she was when I had started her on Lasix.     Take Lasix once a day for one week and call me.        The following portions of the patient's history were reviewed and updated as appropriate: allergies, current medications, past family history, past medical history, past social history, past surgical history and problem list.      Current Outpatient Medications:   •  Accu-Chek Guide test strip, , Disp: , Rfl:   •  allopurinol (ZYLOPRIM) 300 MG tablet, , Disp: , Rfl:   •  atorvastatin (LIPITOR) 40 MG tablet, TAKE 1 TABLET BY MOUTH ONCE DAILY, Disp: 90 tablet, Rfl: 2  •  cetirizine (zyrTEC) 10 MG tablet, TAKE 1 TABLET BY MOUTH ONCE DAILY, Disp: 90 tablet, Rfl: 0  •  diclofenac (VOLTAREN) 75 MG EC tablet, TAKE 1 TABLET BY MOUTH TWICE DAILY, Disp: 180 tablet, Rfl: 1  •  fluticasone (FLONASE) 50 MCG/ACT nasal spray, 1 spray into the nostril(s) as directed by provider Daily., Disp: 15.8 mL, Rfl: 3  •  furosemide (Lasix) 20 MG tablet, Take 1 tablet by mouth 3 (Three) Times a Week for 90 days., Disp: 12 tablet, Rfl: 2  •  levothyroxine (SYNTHROID, LEVOTHROID) 125 MCG tablet, , Disp: , Rfl:   •  montelukast (SINGULAIR) 10 MG tablet, TAKE 1 TABLET BY MOUTH ONCE DAILY, Disp: 90 tablet, Rfl: 1  •  TRUEplus Lancets 30G misc, , Disp: , Rfl:   •  budesonide (PULMICORT) 0.5 MG/2ML nebulizer solution, USE THE CONTENTS OF 1 VIAL IN NEBULIZER TWICE A DAY, Disp: 120 mL, Rfl: 9  •  Insulin Pen Needle (Pen Needles) 31G X 5 MM misc, Place 1 each on the skin as directed by provider 1 (One) Time Per Week., Disp: 100 each, Rfl: 1  •  losartan (Cozaar) 25 MG tablet, Take 1 tablet  "by mouth Daily for 90 days., Disp: 30 tablet, Rfl: 2  •  metFORMIN (GLUCOPHAGE) 500 MG tablet, TAKE 1 TABLET BY MOUTH EVERY 12 HOURS WITH FOOD, Disp: 180 tablet, Rfl: 1  •  Ozempic, 0.25 or 0.5 MG/DOSE, 2 MG/1.5ML solution pen-injector, INJECT 0.25MG UNDER THE SKIN 1 TIME PER WEEK FOR 28 DAYS, THEN 0.5MG 1 TIME PER WEEK FOR 28 DAYS, THEN 1MG 1 TIME PER WEEK FOR 28 DAYS, Disp: 1.5 mL, Rfl: 0            Physical Exam  /84 (BP Location: Left arm)   Pulse 89   Temp 97.7 °F (36.5 °C) (Infrared)   Ht 165.1 cm (65\")   Wt 111 kg (245 lb 12.8 oz)   SpO2 98%   BMI 40.90 kg/m²     Physical Exam  Vitals reviewed.   Constitutional:       General: She is not in acute distress.     Appearance: Normal appearance.   HENT:      Head: Normocephalic and atraumatic.      Nose: Nose normal.      Mouth/Throat:      Mouth: Mucous membranes are moist.   Eyes:      Conjunctiva/sclera: Conjunctivae normal.   Cardiovascular:      Rate and Rhythm: Normal rate and regular rhythm.      Pulses: Normal pulses.      Heart sounds: Normal heart sounds.   Pulmonary:      Effort: Pulmonary effort is normal.      Breath sounds: Normal breath sounds.   Abdominal:      Palpations: Abdomen is soft.      Tenderness: There is no abdominal tenderness.   Musculoskeletal:      Right lower leg: No edema.      Left lower leg: No edema.   Skin:     General: Skin is warm and dry.   Neurological:      General: No focal deficit present.      Mental Status: She is alert.   Psychiatric:         Mood and Affect: Mood normal.           Results for orders placed or performed in visit on 03/02/23   TSH    Specimen: Blood   Result Value Ref Range    TSH 0.238 (L) 0.450 - 4.500 uIU/mL   T4, free    Specimen: Blood   Result Value Ref Range    Free T4 2.01 (H) 0.82 - 1.77 ng/dL   Lipid Panel With LDL / HDL Ratio    Specimen: Blood   Result Value Ref Range    Total Cholesterol 120 100 - 199 mg/dL    Triglycerides 95 0 - 149 mg/dL    HDL Cholesterol 46 >39 mg/dL    " VLDL Cholesterol Fredrick 18 5 - 40 mg/dL    LDL Chol Calc (NIH) 56 0 - 99 mg/dL    LDL/HDL RATIO 1.2 0.0 - 3.2 ratio   Comprehensive Metabolic Panel    Specimen: Blood   Result Value Ref Range    Glucose 111 (H) 70 - 99 mg/dL    BUN 20 8 - 27 mg/dL    Creatinine 1.14 (H) 0.57 - 1.00 mg/dL    EGFR Result 51 (L) >59 mL/min/1.73    BUN/Creatinine Ratio 18 12 - 28    Sodium 141 134 - 144 mmol/L    Potassium 4.6 3.5 - 5.2 mmol/L    Chloride 103 96 - 106 mmol/L    Total CO2 20 20 - 29 mmol/L    Calcium 9.3 8.7 - 10.3 mg/dL    Total Protein 6.8 6.0 - 8.5 g/dL    Albumin 4.1 3.7 - 4.7 g/dL    Globulin 2.7 1.5 - 4.5 g/dL    A/G Ratio 1.5 1.2 - 2.2    Total Bilirubin 0.5 0.0 - 1.2 mg/dL    Alkaline Phosphatase 112 44 - 121 IU/L    AST (SGOT) 23 0 - 40 IU/L    ALT (SGPT) 15 0 - 32 IU/L   Uric Acid    Specimen: Blood   Result Value Ref Range    Uric Acid 3.6 3.1 - 7.9 mg/dL           Diagnoses and all orders for this visit:    1. Mixed hyperlipidemia (Primary)  -     TSH  -     T4, free  -     Lipid Panel With LDL / HDL Ratio    2. Acquired hypothyroidism  -     TSH  -     T4, free  -     Lipid Panel With LDL / HDL Ratio    3. Lower extremity edema  -     Comprehensive Metabolic Panel    4. Toe swelling  -     Uric Acid        Re-check lipids, thyroid due to being abnormal previously and having some symptoms of fatigue.  Re-check kidney function with return of LE edema.  May need to refer to cards at some point to insure no alternative work up needed.  I do not believe she has gout, but given 1 specific toe swelling complained of, will check uric acid one more time.  Increase Lasix to daily from 3x weekly and call me in 1 week to let me know how she is doing.

## 2023-03-03 LAB
ALBUMIN SERPL-MCNC: 4.1 G/DL (ref 3.7–4.7)
ALBUMIN/GLOB SERPL: 1.5 {RATIO} (ref 1.2–2.2)
ALP SERPL-CCNC: 112 IU/L (ref 44–121)
ALT SERPL-CCNC: 15 IU/L (ref 0–32)
AST SERPL-CCNC: 23 IU/L (ref 0–40)
BILIRUB SERPL-MCNC: 0.5 MG/DL (ref 0–1.2)
BUN SERPL-MCNC: 20 MG/DL (ref 8–27)
BUN/CREAT SERPL: 18 (ref 12–28)
CALCIUM SERPL-MCNC: 9.3 MG/DL (ref 8.7–10.3)
CHLORIDE SERPL-SCNC: 103 MMOL/L (ref 96–106)
CHOLEST SERPL-MCNC: 120 MG/DL (ref 100–199)
CO2 SERPL-SCNC: 20 MMOL/L (ref 20–29)
CREAT SERPL-MCNC: 1.14 MG/DL (ref 0.57–1)
EGFRCR SERPLBLD CKD-EPI 2021: 51 ML/MIN/1.73
GLOBULIN SER CALC-MCNC: 2.7 G/DL (ref 1.5–4.5)
GLUCOSE SERPL-MCNC: 111 MG/DL (ref 70–99)
HDLC SERPL-MCNC: 46 MG/DL
LDLC SERPL CALC-MCNC: 56 MG/DL (ref 0–99)
LDLC/HDLC SERPL: 1.2 RATIO (ref 0–3.2)
POTASSIUM SERPL-SCNC: 4.6 MMOL/L (ref 3.5–5.2)
PROT SERPL-MCNC: 6.8 G/DL (ref 6–8.5)
SODIUM SERPL-SCNC: 141 MMOL/L (ref 134–144)
T4 FREE SERPL-MCNC: 2.01 NG/DL (ref 0.82–1.77)
TRIGL SERPL-MCNC: 95 MG/DL (ref 0–149)
TSH SERPL DL<=0.005 MIU/L-ACNC: 0.24 UIU/ML (ref 0.45–4.5)
URATE SERPL-MCNC: 3.6 MG/DL (ref 3.1–7.9)
VLDLC SERPL CALC-MCNC: 18 MG/DL (ref 5–40)

## 2023-03-03 RX ORDER — PEN NEEDLE, DIABETIC 30 GX3/16"
1 NEEDLE, DISPOSABLE MISCELLANEOUS WEEKLY
Qty: 100 EACH | Refills: 1 | Status: SHIPPED | OUTPATIENT
Start: 2023-03-03

## 2023-03-03 RX ORDER — SEMAGLUTIDE 1.34 MG/ML
INJECTION, SOLUTION SUBCUTANEOUS
Qty: 1.5 ML | Refills: 0 | Status: SHIPPED | OUTPATIENT
Start: 2023-03-03

## 2023-03-03 NOTE — TELEPHONE ENCOUNTER
Rx Refill Note  Requested Prescriptions     Pending Prescriptions Disp Refills   • Ozempic, 0.25 or 0.5 MG/DOSE, 2 MG/1.5ML solution pen-injector [Pharmacy Med Name: OZEMPIC INJ 2/1.5ML] 1.5 mL 0     Sig: INJECT 0.25MG UNDER THE SKIN 1 TIME PER WEEK FOR 28 DAYS, THEN 0.5MG 1 TIME PER WEEK FOR 28 DAYS, THEN 1MG 1 TIME PER WEEK FOR 28 DAYS   • Insulin Pen Needle (Pen Needles) 31G X 5 MM misc 100 each 1     Sig: Place 1 each on the skin as directed by provider 1 (One) Time Per Week.      Last office visit with prescribing clinician: 1/12/2023   Last telemedicine visit with prescribing clinician: 3/2/2023   Next office visit with prescribing clinician: 3/2/2023                         Would you like a call back once the refill request has been completed: [] Yes [] No    If the office needs to give you a call back, can they leave a voicemail: [] Yes [] No    Christel Sheppard MA  03/03/23, 09:11 EST

## 2023-03-07 PROBLEM — M79.89 TOE SWELLING: Status: ACTIVE | Noted: 2023-03-07

## 2023-03-07 PROBLEM — R60.0 LOWER EXTREMITY EDEMA: Status: ACTIVE | Noted: 2023-03-07

## 2023-03-09 ENCOUNTER — TELEPHONE (OUTPATIENT)
Dept: INTERNAL MEDICINE | Facility: CLINIC | Age: 73
End: 2023-03-09

## 2023-03-09 NOTE — TELEPHONE ENCOUNTER
Caller: Judith Tenorio    Relationship: Self    Best call back number:[phone] (Home)    What is the best time to reach you: ANYTIME, ASAP    Who are you requesting to speak with (clinical staff, provider,  specific staff member): CLINICAL STAFF/ DR BOOTHE    Do you know the name of the person who called: Judith Tenorio    What was the call regarding: PATIENT STATES SHE HAS BEEN TAKING HER WATER PILL FOR A WEEK AND DOES NOT FEEL ANY BETTER AND IS ASKING WHAT SHE NEEDS TO DO, PATIENT STATES SHE HAS BEEN SLEEPING A LOT AND BEEN REAL OFF BALANCE AND DIZZY, PATIENT STATES HER NOSE HAS BEEN RUNNING A LOT TOO, PLEASE ADVISE PATIENT ASAP    Do you require a callback: YES, ASAP

## 2023-03-10 NOTE — TELEPHONE ENCOUNTER
Spoke to pt. Changed appt time for Tuesday and made explained to her about going to the ER if not improving prior to appt

## 2023-03-14 ENCOUNTER — TELEMEDICINE (OUTPATIENT)
Dept: INTERNAL MEDICINE | Facility: CLINIC | Age: 73
End: 2023-03-14
Payer: MEDICARE

## 2023-03-14 DIAGNOSIS — Z12.31 ENCOUNTER FOR SCREENING MAMMOGRAM FOR MALIGNANT NEOPLASM OF BREAST: ICD-10-CM

## 2023-03-14 DIAGNOSIS — E03.9 ACQUIRED HYPOTHYROIDISM: ICD-10-CM

## 2023-03-14 DIAGNOSIS — K57.92 DIVERTICULITIS: Primary | ICD-10-CM

## 2023-03-14 PROCEDURE — 99213 OFFICE O/P EST LOW 20 MIN: CPT | Performed by: INTERNAL MEDICINE

## 2023-03-14 PROCEDURE — 1159F MED LIST DOCD IN RCRD: CPT | Performed by: INTERNAL MEDICINE

## 2023-03-14 PROCEDURE — 1160F RVW MEDS BY RX/DR IN RCRD: CPT | Performed by: INTERNAL MEDICINE

## 2023-03-14 RX ORDER — HYDROXYZINE HYDROCHLORIDE 25 MG/1
TABLET, FILM COATED ORAL
COMMUNITY
Start: 2023-03-13

## 2023-03-14 RX ORDER — TRIAMCINOLONE ACETONIDE 1 MG/G
CREAM TOPICAL
COMMUNITY
Start: 2023-03-13 | End: 2023-04-07 | Stop reason: SDUPTHER

## 2023-03-14 RX ORDER — ALBUTEROL SULFATE 90 UG/1
AEROSOL, METERED RESPIRATORY (INHALATION)
COMMUNITY
Start: 2023-02-27

## 2023-03-14 RX ORDER — LEVOTHYROXINE SODIUM 112 UG/1
112 TABLET ORAL DAILY
Qty: 90 TABLET | Refills: 0 | Status: SHIPPED | OUTPATIENT
Start: 2023-03-14 | End: 2023-06-12

## 2023-03-14 NOTE — PROGRESS NOTES
Judith Tenorio is a 72 y.o. female who presents with a chief complaint of   Chief Complaint   Patient presents with   • Sinus Problem       HPI     Diverticulitis is bothering her and would like to go back on antibiotics    Sinuses are also bothering her including her nose, facial pain, teeth pain.     Had colonoscopy not too long ago at [facility]     Has not taken her Lasix since last Thursday and is doing okay. Discussed options of stopping Lasix completely, taking truly PRN, or seeing cards now.  Would like to see how she does off Lasix totally and then if fluid returns, see cards.     The following portions of the patient's history were reviewed and updated as appropriate: allergies, current medications, past family history, past medical history, past social history, past surgical history and problem list.      Current Outpatient Medications:   •  Accu-Chek Guide test strip, , Disp: , Rfl:   •  allopurinol (ZYLOPRIM) 300 MG tablet, , Disp: , Rfl:   •  atorvastatin (LIPITOR) 40 MG tablet, TAKE 1 TABLET BY MOUTH ONCE DAILY, Disp: 90 tablet, Rfl: 2  •  budesonide (PULMICORT) 0.5 MG/2ML nebulizer solution, USE THE CONTENTS OF 1 VIAL IN NEBULIZER TWICE A DAY, Disp: 120 mL, Rfl: 9  •  cetirizine (zyrTEC) 10 MG tablet, TAKE 1 TABLET BY MOUTH ONCE DAILY, Disp: 90 tablet, Rfl: 0  •  diclofenac (VOLTAREN) 75 MG EC tablet, TAKE 1 TABLET BY MOUTH TWICE DAILY, Disp: 180 tablet, Rfl: 1  •  fluticasone (FLONASE) 50 MCG/ACT nasal spray, 1 spray into the nostril(s) as directed by provider Daily., Disp: 15.8 mL, Rfl: 3  •  furosemide (Lasix) 20 MG tablet, Take 1 tablet by mouth 3 (Three) Times a Week for 90 days., Disp: 12 tablet, Rfl: 2  •  Insulin Pen Needle (Pen Needles) 31G X 5 MM misc, Place 1 each on the skin as directed by provider 1 (One) Time Per Week., Disp: 100 each, Rfl: 1  •  losartan (Cozaar) 25 MG tablet, Take 1 tablet by mouth Daily for 90 days., Disp: 30 tablet, Rfl: 2  •  metFORMIN  (GLUCOPHAGE) 500 MG tablet, TAKE 1 TABLET BY MOUTH EVERY 12 HOURS WITH FOOD, Disp: 180 tablet, Rfl: 1  •  montelukast (SINGULAIR) 10 MG tablet, TAKE 1 TABLET BY MOUTH ONCE DAILY, Disp: 90 tablet, Rfl: 1  •  Ozempic, 0.25 or 0.5 MG/DOSE, 2 MG/1.5ML solution pen-injector, INJECT 0.25MG UNDER THE SKIN 1 TIME PER WEEK FOR 28 DAYS, THEN 0.5MG 1 TIME PER WEEK FOR 28 DAYS, THEN 1MG 1 TIME PER WEEK FOR 28 DAYS, Disp: 1.5 mL, Rfl: 0  •  TRUEplus Lancets 30G misc, , Disp: , Rfl:   •  albuterol sulfate  (90 Base) MCG/ACT inhaler, , Disp: , Rfl:   •  hydrOXYzine (ATARAX) 25 MG tablet, , Disp: , Rfl:   •  levoFLOXacin (Levaquin) 750 MG tablet, Take 1 tablet by mouth Daily for 7 days., Disp: 7 tablet, Rfl: 0  •  levothyroxine (Synthroid) 112 MCG tablet, Take 1 tablet by mouth Daily for 90 days., Disp: 90 tablet, Rfl: 0  •  metroNIDAZOLE (Flagyl) 500 MG tablet, Take 1 tablet by mouth 3 (Three) Times a Day for 7 days., Disp: 21 tablet, Rfl: 0  •  triamcinolone (KENALOG) 0.1 % cream, , Disp: , Rfl:             Physical Exam    This was a telehealth visit with audio services only.  Video services attempted and unsuccessful.  The visit lasted 10 minutes.  I was present in my office at [facility] and the patient was present at their home.         Results for orders placed or performed in visit on 03/02/23   TSH    Specimen: Blood   Result Value Ref Range    TSH 0.238 (L) 0.450 - 4.500 uIU/mL   T4, free    Specimen: Blood   Result Value Ref Range    Free T4 2.01 (H) 0.82 - 1.77 ng/dL   Lipid Panel With LDL / HDL Ratio    Specimen: Blood   Result Value Ref Range    Total Cholesterol 120 100 - 199 mg/dL    Triglycerides 95 0 - 149 mg/dL    HDL Cholesterol 46 >39 mg/dL    VLDL Cholesterol Fredrick 18 5 - 40 mg/dL    LDL Chol Calc ([facility]) 56 0 - 99 mg/dL    LDL/HDL RATIO 1.2 0.0 - 3.2 ratio   Comprehensive Metabolic Panel    Specimen: Blood   Result Value Ref Range    Glucose 111 (H) 70 - 99 mg/dL    BUN 20 8 - 27 mg/dL    Creatinine 1.14  (H) 0.57 - 1.00 mg/dL    EGFR Result 51 (L) >59 mL/min/1.73    BUN/Creatinine Ratio 18 12 - 28    Sodium 141 134 - 144 mmol/L    Potassium 4.6 3.5 - 5.2 mmol/L    Chloride 103 96 - 106 mmol/L    Total CO2 20 20 - 29 mmol/L    Calcium 9.3 8.7 - 10.3 mg/dL    Total Protein 6.8 6.0 - 8.5 g/dL    Albumin 4.1 3.7 - 4.7 g/dL    Globulin 2.7 1.5 - 4.5 g/dL    A/G Ratio 1.5 1.2 - 2.2    Total Bilirubin 0.5 0.0 - 1.2 mg/dL    Alkaline Phosphatase 112 44 - 121 IU/L    AST (SGOT) 23 0 - 40 IU/L    ALT (SGPT) 15 0 - 32 IU/L   Uric Acid    Specimen: Blood   Result Value Ref Range    Uric Acid 3.6 3.1 - 7.9 mg/dL           Diagnoses and all orders for this visit:    1. Diverticulitis (Primary)  -     levoFLOXacin (Levaquin) 750 MG tablet; Take 1 tablet by mouth Daily for 7 days.  Dispense: 7 tablet; Refill: 0  -     metroNIDAZOLE (Flagyl) 500 MG tablet; Take 1 tablet by mouth 3 (Three) Times a Day for 7 days.  Dispense: 21 tablet; Refill: 0    2. Acquired hypothyroidism  -     levothyroxine (Synthroid) 112 MCG tablet; Take 1 tablet by mouth Daily for 90 days.  Dispense: 90 tablet; Refill: 0    3. Encounter for screening mammogram for malignant neoplasm of breast  -     Mammo screening digital tomosynthesis bilateral w CAD      Would like to hold Lasix for now.  If fluid returns and she requires Lasix again, will refer to Cards.

## 2023-03-19 RX ORDER — METRONIDAZOLE 500 MG/1
500 TABLET ORAL 3 TIMES DAILY
Qty: 21 TABLET | Refills: 0 | Status: SHIPPED | OUTPATIENT
Start: 2023-03-19 | End: 2023-03-26

## 2023-03-19 RX ORDER — LEVOFLOXACIN 750 MG/1
750 TABLET ORAL DAILY
Qty: 7 TABLET | Refills: 0 | Status: SHIPPED | OUTPATIENT
Start: 2023-03-19 | End: 2023-03-26

## 2023-03-21 RX ORDER — COLCHICINE 0.6 MG/1
TABLET ORAL
Qty: 60 TABLET | Refills: 0 | OUTPATIENT
Start: 2023-03-21

## 2023-03-21 NOTE — TELEPHONE ENCOUNTER
Rx Refill Note  Requested Prescriptions     Pending Prescriptions Disp Refills    colchicine 0.6 MG tablet [Pharmacy Med Name: COLCHICINE 0.6MG] 60 tablet 0     Sig: TAKE 1 TABLET BY MOUTH TWICE DAILY      Last office visit with prescribing clinician: 3/2/2023   Last telemedicine visit with prescribing clinician: 4/13/2023   Next office visit with prescribing clinician: 4/13/2023                         Would you like a call back once the refill request has been completed: [] Yes [] No    If the office needs to give you a call back, can they leave a voicemail: [] Yes [] No    Christel Sheppard MA  03/21/23, 12:22 EDT

## 2023-04-07 DIAGNOSIS — E11.9 TYPE 2 DIABETES MELLITUS WITHOUT COMPLICATION, WITHOUT LONG-TERM CURRENT USE OF INSULIN: ICD-10-CM

## 2023-04-07 RX ORDER — TRIAMCINOLONE ACETONIDE 1 MG/G
CREAM TOPICAL 2 TIMES DAILY
Qty: 80 G | Refills: 2 | Status: SHIPPED | OUTPATIENT
Start: 2023-04-07

## 2023-04-07 NOTE — TELEPHONE ENCOUNTER
Rx Refill Note  Requested Prescriptions     Pending Prescriptions Disp Refills    Ozempic, 0.25 or 0.5 MG/DOSE, 2 MG/1.5ML solution pen-injector [Pharmacy Med Name: OZEMPIC DISCONTINUED] 1.5 mL 0     Sig: INJECT 0.25MG UNDER THE SKIN 1 TIME PER WEEK FOR 28 DAYS, THEN 0.5MG 1 TIME PER WEEK FOR 28 DAYS, THEN 1MG 1 TIME PER WEEK FOR 28 DAYS    cetirizine (zyrTEC) 10 MG tablet [Pharmacy Med Name: CETIRIZINE 10MG] 90 tablet 0     Sig: TAKE 1 TABLET BY MOUTH ONCE DAILY      Last office visit with prescribing clinician: 3/2/2023   Last telemedicine visit with prescribing clinician: 4/13/2023   Next office visit with prescribing clinician: 4/13/2023                         Would you like a call back once the refill request has been completed: [] Yes [] No    If the office needs to give you a call back, can they leave a voicemail: [] Yes [] No    Christel Sheppard MA  04/07/23, 10:07 EDT

## 2023-04-07 NOTE — TELEPHONE ENCOUNTER
Rx Refill Note  Requested Prescriptions     Pending Prescriptions Disp Refills    triamcinolone (KENALOG) 0.1 % cream        Last office visit with prescribing clinician: 3/2/2023   Last telemedicine visit with prescribing clinician: 4/7/2023   Next office visit with prescribing clinician: 4/7/2023                         Would you like a call back once the refill request has been completed: [] Yes [] No    If the office needs to give you a call back, can they leave a voicemail: [] Yes [] No    Christel Sheppard MA  04/07/23, 10:08 EDT

## 2023-04-07 NOTE — TELEPHONE ENCOUNTER
Caller: Jona Smithaferdinand Pollard    Relationship: Self    Best call back number: [phone]    Requested Prescriptions:   Requested Prescriptions     Pending Prescriptions Disp Refills   • triamcinolone (KENALOG) 0.1 % cream          Pharmacy where request should be sent: [facility] - [place], [place] - [address] [facility] [phone] [facility] [phone] FX     Last office visit with prescribing clinician: 3/2/2023   Last telemedicine visit with prescribing clinician: 4/13/2023   Next office visit with prescribing clinician: 4/13/2023     Additional details provided by patient: HAS BEEN OUT SINCE Wednesday AND SHE WOULD LIKE 3 MONTH SUPPLY.    Does the patient have less than a 3 day supply:  [x] Yes  [] No    Would you like a call back once the refill request has been completed: [x] Yes [] No    If the office needs to give you a call back, can they leave a voicemail: [x] Yes [] No    Marge Mares, PCT   04/07/23 09:41 EDT

## 2023-04-10 RX ORDER — SEMAGLUTIDE 1.34 MG/ML
INJECTION, SOLUTION SUBCUTANEOUS
Qty: 1.5 ML | Refills: 0 | OUTPATIENT
Start: 2023-04-10

## 2023-04-10 RX ORDER — CETIRIZINE HYDROCHLORIDE 10 MG/1
TABLET ORAL
Qty: 90 TABLET | Refills: 3 | Status: SHIPPED | OUTPATIENT
Start: 2023-04-10

## 2023-04-19 RX ORDER — SEMAGLUTIDE 1.34 MG/ML
0.5 INJECTION, SOLUTION SUBCUTANEOUS WEEKLY
Qty: 1.5 ML | Refills: 0 | Status: SHIPPED | OUTPATIENT
Start: 2023-04-19

## 2023-05-12 RX ORDER — ALLOPURINOL 300 MG/1
TABLET ORAL
Qty: 90 TABLET | Refills: 3 | Status: SHIPPED | OUTPATIENT
Start: 2023-05-12

## 2023-05-25 ENCOUNTER — OFFICE VISIT (OUTPATIENT)
Dept: INTERNAL MEDICINE | Facility: CLINIC | Age: 73
End: 2023-05-25
Payer: MEDICARE

## 2023-05-25 VITALS
SYSTOLIC BLOOD PRESSURE: 116 MMHG | HEIGHT: 65 IN | WEIGHT: 247.4 LBS | OXYGEN SATURATION: 96 % | TEMPERATURE: 97.8 F | BODY MASS INDEX: 41.22 KG/M2 | HEART RATE: 96 BPM | DIASTOLIC BLOOD PRESSURE: 74 MMHG

## 2023-05-25 DIAGNOSIS — R30.0 DYSURIA: ICD-10-CM

## 2023-05-25 DIAGNOSIS — M79.604 PAIN IN BOTH LOWER EXTREMITIES: ICD-10-CM

## 2023-05-25 DIAGNOSIS — M19.049 HAND ARTHRITIS: ICD-10-CM

## 2023-05-25 DIAGNOSIS — E11.9 TYPE 2 DIABETES MELLITUS WITHOUT COMPLICATION, WITHOUT LONG-TERM CURRENT USE OF INSULIN: Primary | ICD-10-CM

## 2023-05-25 DIAGNOSIS — Z51.81 ENCOUNTER FOR THERAPEUTIC DRUG LEVEL MONITORING: ICD-10-CM

## 2023-05-25 DIAGNOSIS — M79.605 PAIN IN BOTH LOWER EXTREMITIES: ICD-10-CM

## 2023-05-25 LAB
BILIRUB BLD-MCNC: ABNORMAL MG/DL
CLARITY, POC: ABNORMAL
COLOR UR: YELLOW
EXPIRATION DATE: ABNORMAL
GLUCOSE UR STRIP-MCNC: NEGATIVE MG/DL
KETONES UR QL: ABNORMAL
LEUKOCYTE EST, POC: NEGATIVE
Lab: ABNORMAL
NITRITE UR-MCNC: NEGATIVE MG/ML
PH UR: 5.5 [PH] (ref 5–8)
PROT UR STRIP-MCNC: ABNORMAL MG/DL
RBC # UR STRIP: NEGATIVE /UL
SP GR UR: 1.02 (ref 1–1.03)
UROBILINOGEN UR QL: NORMAL

## 2023-05-25 RX ORDER — SEMAGLUTIDE 0.68 MG/ML
0.5 INJECTION, SOLUTION SUBCUTANEOUS WEEKLY
COMMUNITY
Start: 2023-04-22 | End: 2023-05-25 | Stop reason: SDUPTHER

## 2023-05-25 RX ORDER — GABAPENTIN 300 MG/1
300 CAPSULE ORAL 2 TIMES DAILY
Qty: 30 CAPSULE | Refills: 0 | Status: SHIPPED | OUTPATIENT
Start: 2023-05-25

## 2023-05-25 NOTE — PROGRESS NOTES
Judith Tenorio is a 73 y.o. female who presents with a chief complaint of   Chief Complaint   Patient presents with   • Back Pain     Pt states that she is having back pain on both sides of her lower back.  Pt would also like to discuss her arthritis medication.       HPI     Arthritis pain is bothering her and she has been taking Gabapentin that she had from previous doctor.  Re-start Gabapentin and send to rheumatology to eval hands as her daughter has recent diagnosis of psoriasis and she would like to be evaluated for this.     Stop ozempic because she is having some fear of giving herself the pen and re-start Metformin.      The following portions of the patient's history were reviewed and updated as appropriate: allergies, current medications, past family history, past medical history, past social history, past surgical history and problem list.      Current Outpatient Medications:   •  Accu-Chek Guide test strip, , Disp: , Rfl:   •  albuterol sulfate  (90 Base) MCG/ACT inhaler, , Disp: , Rfl:   •  allopurinol (ZYLOPRIM) 300 MG tablet, TAKE 1 TABLET BY MOUTH ONCE DAILY, Disp: 90 tablet, Rfl: 3  •  atorvastatin (LIPITOR) 40 MG tablet, TAKE 1 TABLET BY MOUTH ONCE DAILY, Disp: 90 tablet, Rfl: 2  •  budesonide (PULMICORT) 0.5 MG/2ML nebulizer solution, USE THE CONTENTS OF 1 VIAL IN NEBULIZER TWICE A DAY, Disp: 120 mL, Rfl: 9  •  cetirizine (zyrTEC) 10 MG tablet, TAKE 1 TABLET BY MOUTH ONCE DAILY, Disp: 90 tablet, Rfl: 3  •  fluticasone (FLONASE) 50 MCG/ACT nasal spray, 1 spray into the nostril(s) as directed by provider Daily., Disp: 15.8 mL, Rfl: 3  •  Insulin Pen Needle (Pen Needles) 31G X 5 MM misc, Place 1 each on the skin as directed by provider 1 (One) Time Per Week., Disp: 100 each, Rfl: 1  •  levothyroxine (Synthroid) 112 MCG tablet, Take 1 tablet by mouth Daily for 90 days., Disp: 90 tablet, Rfl: 0  •  metFORMIN (GLUCOPHAGE) 500 MG tablet, Take 1 tablet by mouth Every 12 (Twelve)  "Hours. with food, Disp: 180 tablet, Rfl: 3  •  montelukast (SINGULAIR) 10 MG tablet, TAKE 1 TABLET BY MOUTH ONCE DAILY, Disp: 90 tablet, Rfl: 1  •  triamcinolone (KENALOG) 0.1 % cream, Apply  topically to the appropriate area as directed 2 (Two) Times a Day., Disp: 80 g, Rfl: 2  •  TRUEplus Lancets 30G misc, , Disp: , Rfl:   •  gabapentin (NEURONTIN) 300 MG capsule, Take 1 capsule by mouth 2 (Two) Times a Day., Disp: 30 capsule, Rfl: 0  •  hydrOXYzine (ATARAX) 25 MG tablet, , Disp: , Rfl:   •  losartan (Cozaar) 25 MG tablet, Take 1 tablet by mouth Daily for 90 days., Disp: 30 tablet, Rfl: 2            Physical Exam  /74 (BP Location: Left arm, Patient Position: Sitting, Cuff Size: Large Adult)   Pulse 96   Temp 97.8 °F (36.6 °C) (Infrared)   Ht 165.1 cm (65\")   Wt 112 kg (247 lb 6.4 oz)   SpO2 96%   BMI 41.17 kg/m²     Physical Exam  Vitals reviewed.   Constitutional:       General: She is not in acute distress.     Appearance: Normal appearance.   HENT:      Head: Normocephalic and atraumatic.      Nose: Nose normal.      Mouth/Throat:      Mouth: Mucous membranes are moist.   Eyes:      Conjunctiva/sclera: Conjunctivae normal.   Cardiovascular:      Rate and Rhythm: Normal rate and regular rhythm.      Pulses: Normal pulses.      Heart sounds: Normal heart sounds.   Pulmonary:      Effort: Pulmonary effort is normal.      Breath sounds: Normal breath sounds.   Musculoskeletal:      Right lower leg: No edema.      Left lower leg: No edema.      Comments: Deviation of DIP on right hand c/w OA   Skin:     General: Skin is warm and dry.   Neurological:      General: No focal deficit present.      Mental Status: She is alert.   Psychiatric:         Mood and Affect: Mood normal.           Results for orders placed or performed in visit on 03/02/23   TSH    Specimen: Blood   Result Value Ref Range    TSH 0.238 (L) 0.450 - 4.500 uIU/mL   T4, free    Specimen: Blood   Result Value Ref Range    Free T4 2.01 (H) " 0.82 - 1.77 ng/dL   Lipid Panel With LDL / HDL Ratio    Specimen: Blood   Result Value Ref Range    Total Cholesterol 120 100 - 199 mg/dL    Triglycerides 95 0 - 149 mg/dL    HDL Cholesterol 46 >39 mg/dL    VLDL Cholesterol Fredrick 18 5 - 40 mg/dL    LDL Chol Calc ([facility]) 56 0 - 99 mg/dL    LDL/HDL RATIO 1.2 0.0 - 3.2 ratio   Comprehensive Metabolic Panel    Specimen: Blood   Result Value Ref Range    Glucose 111 (H) 70 - 99 mg/dL    BUN 20 8 - 27 mg/dL    Creatinine 1.14 (H) 0.57 - 1.00 mg/dL    EGFR Result 51 (L) >59 mL/min/1.73    BUN/Creatinine Ratio 18 12 - 28    Sodium 141 134 - 144 mmol/L    Potassium 4.6 3.5 - 5.2 mmol/L    Chloride 103 96 - 106 mmol/L    Total CO2 20 20 - 29 mmol/L    Calcium 9.3 8.7 - 10.3 mg/dL    Total Protein 6.8 6.0 - 8.5 g/dL    Albumin 4.1 3.7 - 4.7 g/dL    Globulin 2.7 1.5 - 4.5 g/dL    A/G Ratio 1.5 1.2 - 2.2    Total Bilirubin 0.5 0.0 - 1.2 mg/dL    Alkaline Phosphatase 112 44 - 121 IU/L    AST (SGOT) 23 0 - 40 IU/L    ALT (SGPT) 15 0 - 32 IU/L   Uric Acid    Specimen: Blood   Result Value Ref Range    Uric Acid 3.6 3.1 - 7.9 mg/dL           Diagnoses and all orders for this visit:    1. Type 2 diabetes mellitus without complication, without long-term current use of insulin (Primary)  -     Urine Drug Screen - Urine, Clean Catch  -     metFORMIN (GLUCOPHAGE) 500 MG tablet; Take 1 tablet by mouth Every 12 (Twelve) Hours. with food  Dispense: 180 tablet; Refill: 3    2. Hand arthritis  -     Cancel: Ambulatory Referral to Rheumatology  -     Ambulatory Referral to Rheumatology    3. Encounter for therapeutic drug level monitoring  -     Urine Drug Screen - Urine, Clean Catch    4. Dysuria  -     POC Urinalysis Dipstick, Automated  -     Urine Culture - , Urine, Clean Catch  -     Basic Metabolic Panel    5. Pain in both lower extremities  -     gabapentin (NEURONTIN) 300 MG capsule; Take 1 capsule by mouth 2 (Two) Times a Day.  Dispense: 30 capsule; Refill: 0      Has fear of giving  herself ozempic and has to have daughter in law give to her so will stop and put back on metformin.    Patient concerned about psoriatic arthritis as her daughter was recently diagnosed.  Hands look c/w OA, but will refer to be sure psoriatic not being missed    Re-start gabapentin as neuropathy has been bothersome again.    U/a obtained and not c/w UTI.  Urine culture sent.

## 2023-05-26 LAB
BUN SERPL-MCNC: 20 MG/DL (ref 8–23)
BUN/CREAT SERPL: 16.3 (ref 7–25)
CALCIUM SERPL-MCNC: 9.9 MG/DL (ref 8.6–10.5)
CHLORIDE SERPL-SCNC: 106 MMOL/L (ref 98–107)
CO2 SERPL-SCNC: 26.3 MMOL/L (ref 22–29)
CREAT SERPL-MCNC: 1.23 MG/DL (ref 0.57–1)
EGFRCR SERPLBLD CKD-EPI 2021: 45.9 ML/MIN/1.73
GLUCOSE SERPL-MCNC: 84 MG/DL (ref 65–99)
POTASSIUM SERPL-SCNC: 4.9 MMOL/L (ref 3.5–5.2)
SODIUM SERPL-SCNC: 142 MMOL/L (ref 136–145)

## 2023-05-27 LAB
BACTERIA UR CULT: NO GROWTH
BACTERIA UR CULT: NORMAL

## 2023-06-12 ENCOUNTER — TELEPHONE (OUTPATIENT)
Dept: INTERNAL MEDICINE | Facility: CLINIC | Age: 73
End: 2023-06-12

## 2023-06-12 NOTE — TELEPHONE ENCOUNTER
Patient has called and said the metformin is making her sick, she wants to go back to taking her shot for her diabetes. Also, needs to see an arthritis doctor for arthritis in her spine. Would like to have appt. Set up for that.

## 2023-06-19 NOTE — TELEPHONE ENCOUNTER
Caller: RACHAEL WATSON    Relationship: PATIENT    Best call back number:     Requested Prescriptions:   Requested Prescriptions      No prescriptions requested or ordered in this encounter    OZEMPIC    Pharmacy where request should be sent:  [address]  [phone]    Last office visit with prescribing clinician: 5/25/2023   Last telemedicine visit with prescribing clinician: 3/14/2023   Next office visit with prescribing clinician: Visit date not found     Additional details provided by patient: PATIENT SAYS THAT THE METFORMIN HAS A LOT OF SIDE AFFECTS THAT SHE IS NOT WILLING TO PUT UP WITH AND SHE WANTS TO START BACK ON HER OZEMPIC    Does the patient have less than a 3 day supply:  [x] Yes  [] No    Would you like a call back once the refill request has been completed: [x] Yes [] No    If the office needs to give you a call back, can they leave a voicemail: [] Yes [x] No    Reina Summers Rep   06/19/23 15:10 EDT

## 2023-08-14 DIAGNOSIS — R05.2 SUBACUTE COUGH: ICD-10-CM

## 2023-08-14 NOTE — TELEPHONE ENCOUNTER
Rx Refill Note  Requested Prescriptions     Pending Prescriptions Disp Refills    benzonatate (TESSALON) 100 MG capsule [Pharmacy Med Name: BENZONATATE 100MG] 42 capsule 0     Sig: Take 1 capsule by mouth 3 (Three) Times a Day As Needed for Cough for up to 14 days.      Last office visit with prescribing clinician: 5/25/2023   Last telemedicine visit with prescribing clinician: 6/23/2023   Next office visit with prescribing clinician: Visit date not found                         Would you like a call back once the refill request has been completed: [] Yes [] No    If the office needs to give you a call back, can they leave a voicemail: [] Yes [] No    Huong Hinson, PCT  08/14/23, 17:39 EDT

## 2023-08-15 RX ORDER — BENZONATATE 100 MG/1
100 CAPSULE ORAL 3 TIMES DAILY PRN
Qty: 45 CAPSULE | Refills: 0 | Status: SHIPPED | OUTPATIENT
Start: 2023-08-15

## 2023-08-21 DIAGNOSIS — R05.2 SUBACUTE COUGH: ICD-10-CM

## 2023-08-22 RX ORDER — FUROSEMIDE 20 MG/1
TABLET ORAL
Qty: 12 TABLET | Refills: 1 | OUTPATIENT
Start: 2023-08-22

## 2023-08-22 NOTE — TELEPHONE ENCOUNTER
Active on medication list   Says this med was discontinued on 5/25/23 by yourself.  Rx Refill Note  Requested Prescriptions     Pending Prescriptions Disp Refills    furosemide (LASIX) 20 MG tablet [Pharmacy Med Name: FUROSEMIDE 20MG] 12 tablet 1     Sig: TAKE 1 TABLET BY MOUTH 3 TIMES A WEEK FOR 90 DAYS      Last office visit with prescribing clinician: 5/25/2023   Last telemedicine visit with prescribing clinician: 6/23/2023   Next office visit with prescribing clinician: Visit date not found                         Would you like a call back once the refill request has been completed: [] Yes [] No    If the office needs to give you a call back, can they leave a voicemail: [] Yes [] No    Huong Hinson, PCT  08/22/23, 11:34 EDT

## 2023-08-25 ENCOUNTER — OFFICE VISIT (OUTPATIENT)
Dept: INTERNAL MEDICINE | Facility: CLINIC | Age: 73
End: 2023-08-25
Payer: MEDICARE

## 2023-08-25 VITALS
WEIGHT: 255 LBS | OXYGEN SATURATION: 97 % | SYSTOLIC BLOOD PRESSURE: 128 MMHG | HEART RATE: 67 BPM | TEMPERATURE: 97.8 F | HEIGHT: 65 IN | DIASTOLIC BLOOD PRESSURE: 76 MMHG | BODY MASS INDEX: 42.49 KG/M2

## 2023-08-25 DIAGNOSIS — J30.1 SEASONAL ALLERGIC RHINITIS DUE TO POLLEN: ICD-10-CM

## 2023-08-25 DIAGNOSIS — E11.9 TYPE 2 DIABETES MELLITUS WITHOUT COMPLICATION, WITHOUT LONG-TERM CURRENT USE OF INSULIN: ICD-10-CM

## 2023-08-25 DIAGNOSIS — Z87.19 HISTORY OF GALLSTONES: ICD-10-CM

## 2023-08-25 DIAGNOSIS — E03.9 ACQUIRED HYPOTHYROIDISM: ICD-10-CM

## 2023-08-25 DIAGNOSIS — E78.2 MIXED HYPERLIPIDEMIA: ICD-10-CM

## 2023-08-25 DIAGNOSIS — I10 PRIMARY HYPERTENSION: ICD-10-CM

## 2023-08-25 DIAGNOSIS — M19.91 PRIMARY OSTEOARTHRITIS, UNSPECIFIED SITE: ICD-10-CM

## 2023-08-25 DIAGNOSIS — I50.32 CHRONIC DIASTOLIC (CONGESTIVE) HEART FAILURE: Primary | ICD-10-CM

## 2023-08-25 DIAGNOSIS — J45.21 MILD INTERMITTENT ASTHMA WITH ACUTE EXACERBATION: ICD-10-CM

## 2023-08-25 RX ORDER — BUDESONIDE AND FORMOTEROL FUMARATE DIHYDRATE 160; 4.5 UG/1; UG/1
2 AEROSOL RESPIRATORY (INHALATION)
Qty: 6 G | Refills: 6 | Status: SHIPPED | OUTPATIENT
Start: 2023-08-25

## 2023-08-25 RX ORDER — MONTELUKAST SODIUM 10 MG/1
10 TABLET ORAL DAILY
Qty: 90 TABLET | Refills: 3 | Status: SHIPPED | OUTPATIENT
Start: 2023-08-25

## 2023-08-25 RX ORDER — FUROSEMIDE 20 MG/1
20 TABLET ORAL 3 TIMES WEEKLY
Qty: 36 TABLET | Refills: 3 | Status: SHIPPED | OUTPATIENT
Start: 2023-08-25

## 2023-08-25 RX ORDER — BUDESONIDE AND FORMOTEROL FUMARATE DIHYDRATE 160; 4.5 UG/1; UG/1
AEROSOL RESPIRATORY (INHALATION)
Qty: 10.2 G | Refills: 1 | OUTPATIENT
Start: 2023-08-25

## 2023-08-25 RX ORDER — PREDNISONE 50 MG/1
TABLET ORAL
COMMUNITY
Start: 2023-08-24

## 2023-08-25 RX ORDER — MELOXICAM 7.5 MG/1
7.5 TABLET ORAL DAILY
Qty: 30 TABLET | Refills: 0 | Status: SHIPPED | OUTPATIENT
Start: 2023-08-25

## 2023-08-25 RX ORDER — HYDROXYZINE HYDROCHLORIDE 25 MG/1
TABLET, FILM COATED ORAL
COMMUNITY
Start: 2023-08-08

## 2023-08-25 RX ORDER — FUROSEMIDE 20 MG/1
TABLET ORAL
COMMUNITY
Start: 2023-08-14 | End: 2023-08-25 | Stop reason: SDUPTHER

## 2023-08-25 RX ORDER — ALBUTEROL SULFATE 2.5 MG/3ML
2.5 SOLUTION RESPIRATORY (INHALATION) EVERY 4 HOURS PRN
Qty: 3 ML | Refills: 12 | Status: SHIPPED | OUTPATIENT
Start: 2023-08-25

## 2023-08-25 RX ORDER — PREDNISONE 20 MG/1
TABLET ORAL
Qty: 9 TABLET | Refills: 0 | Status: SHIPPED | OUTPATIENT
Start: 2023-08-25 | End: 2023-08-31

## 2023-08-25 RX ORDER — ALBUTEROL SULFATE 90 UG/1
2 AEROSOL, METERED RESPIRATORY (INHALATION) EVERY 4 HOURS PRN
Qty: 8 G | Refills: 3 | Status: SHIPPED | OUTPATIENT
Start: 2023-08-25

## 2023-08-25 RX ORDER — ALBUTEROL SULFATE 90 UG/1
AEROSOL, METERED RESPIRATORY (INHALATION)
Qty: 17 G | Refills: 3 | OUTPATIENT
Start: 2023-08-25

## 2023-08-25 RX ORDER — LEVOTHYROXINE SODIUM 112 UG/1
112 TABLET ORAL DAILY
Qty: 90 TABLET | Refills: 3 | Status: SHIPPED | OUTPATIENT
Start: 2023-08-25

## 2023-08-25 NOTE — PROGRESS NOTES
Judith Tenorio is a 73 y.o. female who presents with a chief complaint of   Chief Complaint   Patient presents with    Hospital Follow Up Visit     Seen in [facility] 8/23 due to shortness of breath. She was told it was a copd flare up. Congestion and has to sleep sitting up in recliner.        HPI     SOA--CHF + allergic asthma exacerbation    The following portions of the patient's history were reviewed and updated as appropriate: allergies, current medications, past family history, past medical history, past social history, past surgical history and problem list.      Current Outpatient Medications:     allopurinol (ZYLOPRIM) 300 MG tablet, TAKE 1 TABLET BY MOUTH ONCE DAILY, Disp: 90 tablet, Rfl: 3    atorvastatin (LIPITOR) 40 MG tablet, TAKE 1 TABLET BY MOUTH ONCE DAILY, Disp: 90 tablet, Rfl: 2    benzonatate (TESSALON) 100 MG capsule, Take 1 capsule by mouth 3 (Three) Times a Day As Needed for Cough., Disp: 45 capsule, Rfl: 0    cetirizine (zyrTEC) 10 MG tablet, TAKE 1 TABLET BY MOUTH ONCE DAILY, Disp: 90 tablet, Rfl: 3    fluticasone (FLONASE) 50 MCG/ACT nasal spray, 1 spray into the nostril(s) as directed by provider Daily., Disp: 15.8 mL, Rfl: 3    furosemide (LASIX) 20 MG tablet, Take 1 tablet by mouth 3 (Three) Times a Week., Disp: 36 tablet, Rfl: 3    gabapentin (NEURONTIN) 300 MG capsule, Take 1 capsule by mouth 2 (Two) Times a Day., Disp: 30 capsule, Rfl: 0    levothyroxine (Synthroid) 112 MCG tablet, Take 1 tablet by mouth Daily., Disp: 90 tablet, Rfl: 3    montelukast (SINGULAIR) 10 MG tablet, Take 1 tablet by mouth Daily., Disp: 90 tablet, Rfl: 3    predniSONE (DELTASONE) 50 MG tablet, , Disp: , Rfl:     triamcinolone (KENALOG) 0.1 % cream, Apply  topically to the appropriate area as directed 2 (Two) Times a Day., Disp: 80 g, Rfl: 2    albuterol (PROVENTIL) (2.5 MG/3ML) 0.083% nebulizer solution, Take 2.5 mg by nebulization Every 4 (Four) Hours As Needed for Wheezing., Disp: 3 mL,  "Rfl: 12    albuterol sulfate  (90 Base) MCG/ACT inhaler, Inhale 2 puffs Every 4 (Four) Hours As Needed for Wheezing., Disp: 8 g, Rfl: 3    budesonide-formoterol (Symbicort) 160-4.5 MCG/ACT inhaler, Inhale 2 puffs 2 (Two) Times a Day., Disp: 6 g, Rfl: 6    hydrOXYzine (ATARAX) 25 MG tablet, , Disp: , Rfl:     Insulin Pen Needle (Pen Needles) 31G X 5 MM misc, Place 1 each on the skin as directed by provider 1 (One) Time Per Week. (Patient not taking: Reported on 6/23/2023), Disp: 100 each, Rfl: 1    meloxicam (Mobic) 7.5 MG tablet, Take 1 tablet by mouth Daily., Disp: 30 tablet, Rfl: 0    predniSONE (DELTASONE) 20 MG tablet, Take 2 tablets by mouth Daily for 3 days, THEN 1 tablet Daily for 3 days. Start after you finish your current course of steroids., Disp: 9 tablet, Rfl: 0    Semaglutide,0.25 or 0.5MG/DOS, (Ozempic, 0.25 or 0.5 MG/DOSE,) 2 MG/3ML solution pen-injector, Inject 0.5 mg once weekly., Disp: 3 mL, Rfl: 0            Physical Exam  /76 (BP Location: Left arm, Patient Position: Sitting, Cuff Size: Adult)   Pulse 67   Temp 97.8 øF (36.6 øC) (Infrared)   Ht 165.1 cm (65\")   Wt 116 kg (255 lb)   SpO2 97%   BMI 42.43 kg/mý     Physical Exam  Vitals reviewed.   Constitutional:       General: She is not in acute distress.     Appearance: Normal appearance.   HENT:      Head: Normocephalic and atraumatic.      Nose: Nose normal.      Mouth/Throat:      Mouth: Mucous membranes are moist.   Eyes:      Conjunctiva/sclera: Conjunctivae normal.   Cardiovascular:      Rate and Rhythm: Normal rate and regular rhythm.      Pulses: Normal pulses.      Heart sounds: Normal heart sounds.   Pulmonary:      Effort: Pulmonary effort is normal.      Breath sounds: Normal breath sounds.   Abdominal:      Palpations: Abdomen is soft.      Tenderness: There is no abdominal tenderness.   Musculoskeletal:      Right lower leg: Edema present.      Left lower leg: Edema present.   Skin:     General: Skin is warm and " dry.   Neurological:      General: No focal deficit present.      Mental Status: She is alert.   Psychiatric:         Mood and Affect: Mood normal.         Results for orders placed or performed in visit on 05/25/23   Urine Culture - , Urine, Clean Catch    Specimen: Urine, Clean Catch    UR   Result Value Ref Range    Urine Culture Final report     Result 1 No growth    Basic Metabolic Panel    Specimen: Blood   Result Value Ref Range    Glucose 84 65 - 99 mg/dL    BUN 20 8 - 23 mg/dL    Creatinine 1.23 (H) 0.57 - 1.00 mg/dL    EGFR Result 45.9 (L) >60.0 mL/min/1.73    BUN/Creatinine Ratio 16.3 7.0 - 25.0    Sodium 142 136 - 145 mmol/L    Potassium 4.9 3.5 - 5.2 mmol/L    Chloride 106 98 - 107 mmol/L    Total CO2 26.3 22.0 - 29.0 mmol/L    Calcium 9.9 8.6 - 10.5 mg/dL   POC Urinalysis Dipstick, Automated    Specimen: Urine   Result Value Ref Range    Color Yellow Yellow, Straw, Dark Yellow, Adina    Clarity, UA Cloudy (A) Clear    Specific Gravity  1.025 1.005 - 1.030    pH, Urine 5.5 5.0 - 8.0    Leukocytes Negative Negative    Nitrite, UA Negative Negative    Protein,  mg/dL (A) Negative mg/dL    Glucose, UA Negative Negative mg/dL    Ketones, UA Trace (A) Negative    Urobilinogen, UA Normal Normal, 0.2 E.U./dL    Bilirubin Moderate (2+) (A) Negative    Blood, UA Negative Negative    Lot Number [id number]     Expiration Date 7/13/24            Diagnoses and all orders for this visit:    1. Chronic diastolic (congestive) heart failure (Primary)  -     furosemide (LASIX) 20 MG tablet; Take 1 tablet by mouth 3 (Three) Times a Week.  Dispense: 36 tablet; Refill: 3    2. Primary hypertension    3. Mixed hyperlipidemia    4. Type 2 diabetes mellitus without complication, without long-term current use of insulin    5. Acquired hypothyroidism  -     levothyroxine (Synthroid) 112 MCG tablet; Take 1 tablet by mouth Daily.  Dispense: 90 tablet; Refill: 3    6. Seasonal allergic rhinitis due to pollen  -      montelukast (SINGULAIR) 10 MG tablet; Take 1 tablet by mouth Daily.  Dispense: 90 tablet; Refill: 3  -     predniSONE (DELTASONE) 20 MG tablet; Take 2 tablets by mouth Daily for 3 days, THEN 1 tablet Daily for 3 days. Start after you finish your current course of steroids.  Dispense: 9 tablet; Refill: 0    7. Mild intermittent asthma with acute exacerbation  -     budesonide-formoterol (Symbicort) 160-4.5 MCG/ACT inhaler; Inhale 2 puffs 2 (Two) Times a Day.  Dispense: 6 g; Refill: 6  -     albuterol sulfate  (90 Base) MCG/ACT inhaler; Inhale 2 puffs Every 4 (Four) Hours As Needed for Wheezing.  Dispense: 8 g; Refill: 3  -     albuterol (PROVENTIL) (2.5 MG/3ML) 0.083% nebulizer solution; Take 2.5 mg by nebulization Every 4 (Four) Hours As Needed for Wheezing.  Dispense: 3 mL; Refill: 12  -     predniSONE (DELTASONE) 20 MG tablet; Take 2 tablets by mouth Daily for 3 days, THEN 1 tablet Daily for 3 days. Start after you finish your current course of steroids.  Dispense: 9 tablet; Refill: 0    8. Primary osteoarthritis, unspecified site  -     meloxicam (Mobic) 7.5 MG tablet; Take 1 tablet by mouth Daily.  Dispense: 30 tablet; Refill: 0    9. History of gallstones  -     US Gallbladder      Reviewed paper records from [facility].  I believe she likely suffers from allergy induced asthma as opposed to COPD, which would explain why her PFTs looked pretty normal if obtained at a time that disease was not active and while she was still regularly using previous inhalers.  I do think her diastolic CHF is overlayed on this diagnosis making it challenging at times to discriminate the cause of shortness of breath.  We will treat both today, which I generally do not like to do, but I think is the best way to keep her out of the hospital.      Continuing to have joint pain.  Again reviewed risk/benefit of NSAIDS especially in the setting of her kidney function.  She would still like to see Rheumatology, but our  affiliate group will not fully take her insurance.  Care coordination performed by our referral coordinator to get her in to [facility].     Provided refills today as well and ordered GB u/s as she has been having some RUQ pain and has history of gallstones.

## 2023-08-29 DIAGNOSIS — E11.9 TYPE 2 DIABETES MELLITUS WITHOUT COMPLICATION, WITHOUT LONG-TERM CURRENT USE OF INSULIN: ICD-10-CM

## 2023-08-30 RX ORDER — SEMAGLUTIDE 0.68 MG/ML
INJECTION, SOLUTION SUBCUTANEOUS
Qty: 3 ML | Refills: 0 | Status: SHIPPED | OUTPATIENT
Start: 2023-08-30

## 2023-09-08 RX ORDER — PREDNISONE 50 MG/1
TABLET ORAL
OUTPATIENT
Start: 2023-09-08

## 2023-09-08 NOTE — TELEPHONE ENCOUNTER
Caller: Judith Tenorio    Relationship: Self    Best call back number:       CELL    Requested Prescriptions:   Requested Prescriptions     Pending Prescriptions Disp Refills    predniSONE (DELTASONE) 50 MG tablet          Pharmacy where request should be sent:  [address]  [phone]    Last office visit with prescribing clinician: 8/25/2023   Last telemedicine visit with prescribing clinician: 6/23/2023   Next office visit with prescribing clinician: 9/12/2023     Additional details provided by patient:     Does the patient have less than a 3 day supply:  [x] Yes  [] No    Would you like a call back once the refill request has been completed: [x] Yes [] No    If the office needs to give you a call back, can they leave a voicemail: [] Yes [x] No    Reina Summers Rep   09/08/23 14:29 EDT

## 2023-09-12 ENCOUNTER — OFFICE VISIT (OUTPATIENT)
Dept: INTERNAL MEDICINE | Facility: CLINIC | Age: 73
End: 2023-09-12
Payer: MEDICARE

## 2023-09-12 VITALS
OXYGEN SATURATION: 94 % | SYSTOLIC BLOOD PRESSURE: 124 MMHG | BODY MASS INDEX: 42.09 KG/M2 | HEIGHT: 65 IN | HEART RATE: 84 BPM | WEIGHT: 252.6 LBS | DIASTOLIC BLOOD PRESSURE: 78 MMHG | TEMPERATURE: 97.3 F

## 2023-09-12 DIAGNOSIS — Z00.00 MEDICARE ANNUAL WELLNESS VISIT, SUBSEQUENT: Primary | ICD-10-CM

## 2023-09-12 DIAGNOSIS — E66.01 CLASS 3 SEVERE OBESITY WITH SERIOUS COMORBIDITY AND BODY MASS INDEX (BMI) OF 40.0 TO 44.9 IN ADULT, UNSPECIFIED OBESITY TYPE: ICD-10-CM

## 2023-09-12 DIAGNOSIS — E11.9 TYPE 2 DIABETES MELLITUS WITHOUT COMPLICATION, WITHOUT LONG-TERM CURRENT USE OF INSULIN: ICD-10-CM

## 2023-09-12 DIAGNOSIS — N18.31 STAGE 3A CHRONIC KIDNEY DISEASE: ICD-10-CM

## 2023-09-12 DIAGNOSIS — R30.0 DYSURIA: ICD-10-CM

## 2023-09-12 DIAGNOSIS — M1A.9XX0 CHRONIC GOUT INVOLVING TOE WITHOUT TOPHUS, UNSPECIFIED CAUSE, UNSPECIFIED LATERALITY: ICD-10-CM

## 2023-09-12 DIAGNOSIS — M17.12 PRIMARY OSTEOARTHRITIS OF LEFT KNEE: ICD-10-CM

## 2023-09-12 DIAGNOSIS — E78.2 MIXED HYPERLIPIDEMIA: ICD-10-CM

## 2023-09-12 DIAGNOSIS — I10 PRIMARY HYPERTENSION: ICD-10-CM

## 2023-09-12 RX ORDER — SEMAGLUTIDE 0.68 MG/ML
INJECTION, SOLUTION SUBCUTANEOUS
Qty: 3 ML | Refills: 0 | Status: SHIPPED | OUTPATIENT
Start: 2023-09-12

## 2023-09-12 RX ORDER — DICLOFENAC SODIUM 75 MG/1
75 TABLET, DELAYED RELEASE ORAL 2 TIMES DAILY
Qty: 180 TABLET | Refills: 1 | Status: SHIPPED | OUTPATIENT
Start: 2023-09-12

## 2023-09-12 NOTE — PROGRESS NOTES
The ABCs of the Annual Wellness Visit  Subsequent Medicare Wellness Visit    Chief Complaint   Patient presents with    Medicare Wellness-subsequent      Subjective    History of Present Illness:  Judith Tenorio is a 73 y.o. female who presents for a Subsequent Medicare Wellness Visit.    The following portions of the patient's history were reviewed and   updated as appropriate: allergies, current medications, past family history, past medical history, past social history, past surgical history, and problem list.    Compared to one year ago, the patient feels her physical   health is worse.    Compared to one year ago, the patient feels her mental   health is the same.    Recent Hospitalizations:  She was not admitted to the hospital during the last year.       Current Medical Providers:  Patient Care Team:  Hayde Bingham MD as PCP - General (Internal Medicine)    Outpatient Medications Prior to Visit   Medication Sig Dispense Refill    albuterol (PROVENTIL) (2.5 MG/3ML) 0.083% nebulizer solution Take 2.5 mg by nebulization Every 4 (Four) Hours As Needed for Wheezing. 3 mL 12    albuterol sulfate  (90 Base) MCG/ACT inhaler Inhale 2 puffs Every 4 (Four) Hours As Needed for Wheezing. 8 g 3    allopurinol (ZYLOPRIM) 300 MG tablet TAKE 1 TABLET BY MOUTH ONCE DAILY 90 tablet 3    atorvastatin (LIPITOR) 40 MG tablet TAKE 1 TABLET BY MOUTH ONCE DAILY 90 tablet 2    benzonatate (TESSALON) 100 MG capsule Take 1 capsule by mouth 3 (Three) Times a Day As Needed for Cough. 45 capsule 0    budesonide-formoterol (Symbicort) 160-4.5 MCG/ACT inhaler Inhale 2 puffs 2 (Two) Times a Day. 6 g 6    cetirizine (zyrTEC) 10 MG tablet TAKE 1 TABLET BY MOUTH ONCE DAILY 90 tablet 3    fluticasone (FLONASE) 50 MCG/ACT nasal spray 1 spray into the nostril(s) as directed by provider Daily. 15.8 mL 3    furosemide (LASIX) 20 MG tablet Take 1 tablet by mouth 3 (Three) Times a Week. 36 tablet 3    gabapentin (NEURONTIN) 300 MG  capsule Take 1 capsule by mouth 2 (Two) Times a Day. 30 capsule 0    hydrOXYzine (ATARAX) 25 MG tablet       Insulin Pen Needle (Pen Needles) 31G X 5 MM misc Place 1 each on the skin as directed by provider 1 (One) Time Per Week. 100 each 1    levothyroxine (Synthroid) 112 MCG tablet Take 1 tablet by mouth Daily. 90 tablet 3    montelukast (SINGULAIR) 10 MG tablet Take 1 tablet by mouth Daily. 90 tablet 3    Semaglutide,0.25 or 0.5MG/DOS, (Ozempic, 0.25 or 0.5 MG/DOSE,) 2 MG/3ML solution pen-injector Inject 0.5 mg once weekly. 3 mL 0    triamcinolone (KENALOG) 0.1 % cream Apply  topically to the appropriate area as directed 2 (Two) Times a Day. 80 g 2    meloxicam (Mobic) 7.5 MG tablet Take 1 tablet by mouth Daily. (Patient not taking: Reported on 9/12/2023) 30 tablet 0    predniSONE (DELTASONE) 50 MG tablet        No facility-administered medications prior to visit.       No opioid medication identified on active medication list. I have reviewed chart for other potential  high risk medication/s and harmful drug interactions in the elderly.        Aspirin is not on active medication list.   Discussed possibility of starting aspirin daily and that she is in a gray zone.  I am okay with her starting or holding pending her risk tolerance for primary prevention vs bleeding .    Patient Active Problem List   Diagnosis    Class 3 severe obesity with serious comorbidity and body mass index (BMI) of 40.0 to 44.9 in adult    Mixed hyperlipidemia    Primary hypertension    Acquired hypothyroidism    Actinic keratosis    Allergic rhinitis    Chronic gout involving toe without tophus    Shortness of breath    Dysuria    Absence of bladder continence    Subacute cough    Chronic diastolic (congestive) heart failure    Type 2 diabetes mellitus without complication, without long-term current use of insulin    Lower extremity edema    Toe swelling     Advance Care Planning  Advance Directive is not on file.  ACP discussion was held  "with the patient during this visit. Patient has discussed with children.  Advised on possibility of advance directive--discuss with family.           Objective    Vitals:    09/12/23 1310   BP: 124/78   BP Location: Left arm   Patient Position: Sitting   Cuff Size: Large Adult   Pulse: 84   Temp: 97.3 °F (36.3 °C)   TempSrc: Infrared   SpO2: 94%   Weight: 115 kg (252 lb 9.6 oz)   Height: 165.1 cm (65\")     Estimated body mass index is 42.03 kg/m² as calculated from the following:    Height as of this encounter: 165.1 cm (65\").    Weight as of this encounter: 115 kg (252 lb 9.6 oz).    Class 3 Severe Obesity (BMI >=40). Obesity-related health conditions include the following: diabetes mellitus and dyslipidemias. Obesity is unchanged. BMI is is above average; BMI management plan is completed. We discussed portion control and increasing exercise.      Does the patient have evidence of cognitive impairment? No    Physical Exam  Vitals reviewed.   Constitutional:       General: She is not in acute distress.     Appearance: Normal appearance.   HENT:      Head: Normocephalic and atraumatic.      Nose: Nose normal.      Mouth/Throat:      Mouth: Mucous membranes are moist.   Eyes:      Conjunctiva/sclera: Conjunctivae normal.   Cardiovascular:      Rate and Rhythm: Normal rate and regular rhythm.      Pulses: Normal pulses.      Heart sounds: Normal heart sounds.   Pulmonary:      Effort: Pulmonary effort is normal.      Breath sounds: Normal breath sounds.   Musculoskeletal:      Right lower leg: Edema present.      Left lower leg: Edema present.   Skin:     General: Skin is warm and dry.   Neurological:      General: No focal deficit present.      Mental Status: She is alert.   Psychiatric:         Mood and Affect: Mood normal.               HEALTH RISK ASSESSMENT    Smoking Status:  Social History     Tobacco Use   Smoking Status Never    Passive exposure: Never   Smokeless Tobacco Never     Alcohol Consumption:  Social " History     Substance and Sexual Activity   Alcohol Use Not Currently     Fall Risk Screen:    CATHERINE Fall Risk Assessment was completed, and patient is at LOW risk for falls.Assessment completed on:2023    Depression Screenin/12/2023     1:21 PM   PHQ-2/PHQ-9 Depression Screening   Little Interest or Pleasure in Doing Things 0-->not at all   Feeling Down, Depressed or Hopeless 0-->not at all   PHQ-9: Brief Depression Severity Measure Score 0       Health Habits and Functional and Cognitive Screenin/12/2023     1:18 PM   Functional & Cognitive Status   Do you have difficulty preparing food and eating? No   Do you have difficulty bathing yourself, getting dressed or grooming yourself? No   Do you have difficulty using the toilet? No   Do you have difficulty moving around from place to place? Yes   Do you have trouble with steps or getting out of a bed or a chair? Yes   Current Diet Unhealthy Diet   Dental Exam Up to date   Eye Exam Up to date   Exercise (times per week) 0 times per week   Current Exercises Include No Regular Exercise;Swimming   Do you need help using the phone?  No   Are you deaf or do you have serious difficulty hearing?  No   Do you need help to go to places out of walking distance? Yes   Do you need help shopping? Yes   Do you need help preparing meals?  No   Do you need help with housework?  No   Do you need help with laundry? No   Do you need help taking your medications? No   Do you need help managing money? No   Do you ever drive or ride in a car without wearing a seat belt? No   Have you felt unusual stress, anger or loneliness in the last month? No   Who do you live with? Alone   If you need help, do you have trouble finding someone available to you? No   Do you have difficulty concentrating, remembering or making decisions? No       Age-appropriate Screening Schedule:  Refer to the list below for future screening recommendations based on patient's age, sex and/or  medical conditions. Orders for these recommended tests are listed in the plan section. The patient has been provided with a written plan.    Health Maintenance   Topic Date Due    URINE MICROALBUMIN  Never done    DXA SCAN  Never done    COLORECTAL CANCER SCREENING  Never done    COVID-19 Vaccine (1) Never done    Pneumococcal Vaccine 65+ (1 - PCV) Never done    TDAP/TD VACCINES (1 - Tdap) Never done    ZOSTER VACCINE (1 of 2) Never done    HEPATITIS C SCREENING  Never done    ANNUAL WELLNESS VISIT  Never done    DIABETIC FOOT EXAM  Never done    DIABETIC EYE EXAM  Never done    HEMOGLOBIN A1C  06/08/2023    INFLUENZA VACCINE  10/01/2023    BMI FOLLOWUP  12/08/2023    LIPID PANEL  03/02/2024              Assessment & Plan   CMS Preventative Services Quick Reference  Risk Factors Identified During Encounter  Chronic Pain: NSAIDs per medication orders.  Inactivity/Sedentary:  Continue to work on being more active with relief of pain with diclofenac  The above risks/problems have been discussed with the patient.  Follow up actions/plans if indicated are seen below in the Assessment/Plan Section.  Pertinent information has been shared with the patient in the After Visit Summary.    Diagnoses and all orders for this visit:    1. Medicare annual wellness visit, subsequent (Primary)    2. Primary hypertension  -     Microalbumin / Creatinine Urine Ratio - Urine, Clean Catch    3. Mixed hyperlipidemia    4. Chronic gout involving toe without tophus, unspecified cause, unspecified laterality    5. Stage 3a chronic kidney disease  -     Renal Function Panel    6. Type 2 diabetes mellitus without complication, without long-term current use of insulin  -     Hemoglobin A1c    7. Class 3 severe obesity with serious comorbidity and body mass index (BMI) of 40.0 to 44.9 in adult, unspecified obesity type  -     Hemoglobin A1c    8. Dysuria  -     Urinalysis With Culture If Indicated -    9. Primary osteoarthritis of left knee  -      diclofenac (VOLTAREN) 75 MG EC tablet; Take 1 tablet by mouth 2 (Two) Times a Day.  Dispense: 180 tablet; Refill: 1  -     Renal Function Panel      Re-start diclofenac after risk/benefit discussion.  Get labs today to monitor renal function.     Had gout flare, but now resolved.  Continue allopurinol    Diabetes doing okay, make sure you go up to 0.5 mg of Ozempic    Work on getting in the pool more frequently to increase exercise.     Follow Up:   Return in about 3 months (around 12/12/2023) for T2DM, renal function, chronic pain.     An After Visit Summary and PPPS were made available to the patient.

## 2023-09-13 LAB
ALBUMIN SERPL-MCNC: 4.2 G/DL (ref 3.5–5.2)
ALBUMIN/CREAT UR: 11 MG/G CREAT (ref 0–29)
BUN SERPL-MCNC: 18 MG/DL (ref 8–23)
BUN/CREAT SERPL: 18.2 (ref 7–25)
CALCIUM SERPL-MCNC: 9.9 MG/DL (ref 8.6–10.5)
CHLORIDE SERPL-SCNC: 105 MMOL/L (ref 98–107)
CO2 SERPL-SCNC: 27.7 MMOL/L (ref 22–29)
CREAT SERPL-MCNC: 0.99 MG/DL (ref 0.57–1)
CREAT UR-MCNC: 232.8 MG/DL
EGFRCR SERPLBLD CKD-EPI 2021: 59.6 ML/MIN/1.73
GLUCOSE SERPL-MCNC: 110 MG/DL (ref 65–99)
HBA1C MFR BLD: 6.1 % (ref 4.8–5.6)
MICROALBUMIN UR-MCNC: 25.9 UG/ML
PHOSPHATE SERPL-MCNC: 2.7 MG/DL (ref 2.5–4.5)
POTASSIUM SERPL-SCNC: 4.4 MMOL/L (ref 3.5–5.2)
SODIUM SERPL-SCNC: 142 MMOL/L (ref 136–145)

## 2023-09-26 ENCOUNTER — HOSPITAL ENCOUNTER (OUTPATIENT)
Dept: ULTRASOUND IMAGING | Facility: HOSPITAL | Age: 73
Discharge: HOME | End: 2023-09-26
Admitting: INTERNAL MEDICINE
Payer: MEDICARE

## 2023-09-26 PROCEDURE — 76705 ECHO EXAM OF ABDOMEN: CPT

## 2023-10-02 DIAGNOSIS — M79.604 PAIN IN BOTH LOWER EXTREMITIES: ICD-10-CM

## 2023-10-02 DIAGNOSIS — M79.605 PAIN IN BOTH LOWER EXTREMITIES: ICD-10-CM

## 2023-10-03 NOTE — TELEPHONE ENCOUNTER
Urine Toxicology Performed in Last 12 Months    Controlled Substance Agreement is on file    Medication not refilled in past 28 days     Rx Refill Note  Requested Prescriptions     Pending Prescriptions Disp Refills    gabapentin (NEURONTIN) 300 MG capsule [Pharmacy Med Name: GABAPENTIN 300MG] 30 capsule 0     Sig: Take 1 capsule by mouth 2 (Two) Times a Day.      Last office visit with prescribing clinician: 9/12/2023   Last telemedicine visit with prescribing clinician: 6/23/2023   Next office visit with prescribing clinician: 12/12/2023                         Would you like a call back once the refill request has been completed: [] Yes [] No    If the office needs to give you a call back, can they leave a voicemail: [] Yes [] No    Huong Hinson, PCT  10/03/23, 11:11 EDT

## 2023-10-04 NOTE — TELEPHONE ENCOUNTER
Active on medication list   Rx Refill Note  Requested Prescriptions     Pending Prescriptions Disp Refills    Lancet Devices (Leader Advanced Lancing Device) misc [Pharmacy Med Name: ADV LANCING MIS DEVICE] 1 each 0     Sig: AS DIRECTED    True Metrix Blood Glucose Test test strip [Pharmacy Med Name: TRUE METRIX WILLIAM GLUCOSE] 100 each 10     Sig: CHECK BLOOD SUGAR ONCE A DAY FOR DIAGNOSIS OF TYPE II DIABETES      Last office visit with prescribing clinician: 9/12/2023   Last telemedicine visit with prescribing clinician: 6/23/2023   Next office visit with prescribing clinician: 12/12/2023                         Would you like a call back once the refill request has been completed: [] Yes [] No    If the office needs to give you a call back, can they leave a voicemail: [] Yes [] No    Huong Hinson, PCT  10/04/23, 14:30 EDT

## 2023-10-05 RX ORDER — GABAPENTIN 300 MG/1
300 CAPSULE ORAL 2 TIMES DAILY
Qty: 60 CAPSULE | Refills: 0 | Status: SHIPPED | OUTPATIENT
Start: 2023-10-05

## 2023-10-05 RX ORDER — LANCING DEVICE
EACH MISCELLANEOUS
Qty: 1 EACH | Refills: 0 | Status: SHIPPED | OUTPATIENT
Start: 2023-10-05

## 2023-10-05 RX ORDER — CALCIUM CITRATE/VITAMIN D3 200MG-6.25
TABLET ORAL
Qty: 100 EACH | Refills: 10 | Status: SHIPPED | OUTPATIENT
Start: 2023-10-05

## 2023-10-05 RX ORDER — GLUCOSAM/CHON-MSM1/C/MANG/BOSW 500-416.6
TABLET ORAL
Qty: 100 EACH | Refills: 5 | Status: SHIPPED | OUTPATIENT
Start: 2023-10-05

## 2023-10-05 NOTE — TELEPHONE ENCOUNTER
Rx Refill Note  Requested Prescriptions     Pending Prescriptions Disp Refills    TRUEplus Lancets 30G misc [Pharmacy Med Name: JENNY LANC MIS 30G] 100 each 5     Sig: CHECK BLOOD SUGAR ONCE A DAY FOR DIAGNOSIS OF TYPE II DIABETES E11.9      Last office visit with prescribing clinician: 9/12/2023   Last telemedicine visit with prescribing clinician: 6/23/2023   Next office visit with prescribing clinician: 12/12/2023                         Would you like a call back once the refill request has been completed: [] Yes [] No    If the office needs to give you a call back, can they leave a voicemail: [] Yes [] No    Alisha Florian MA  10/05/23, 14:55 EDT

## 2023-10-25 DIAGNOSIS — E11.9 TYPE 2 DIABETES MELLITUS WITHOUT COMPLICATION, WITHOUT LONG-TERM CURRENT USE OF INSULIN: ICD-10-CM

## 2023-10-25 RX ORDER — SEMAGLUTIDE 0.68 MG/ML
INJECTION, SOLUTION SUBCUTANEOUS
Qty: 3 ML | Refills: 0 | Status: CANCELLED | OUTPATIENT
Start: 2023-10-25

## 2023-10-25 RX ORDER — ATORVASTATIN CALCIUM 40 MG/1
40 TABLET, FILM COATED ORAL DAILY
Qty: 90 TABLET | Refills: 2 | Status: SHIPPED | OUTPATIENT
Start: 2023-10-25

## 2023-10-25 RX ORDER — SEMAGLUTIDE 0.68 MG/ML
INJECTION, SOLUTION SUBCUTANEOUS
Qty: 3 ML | Refills: 0 | Status: SHIPPED | OUTPATIENT
Start: 2023-10-25

## 2023-10-27 RX ORDER — TRIAMCINOLONE ACETONIDE 1 MG/G
CREAM TOPICAL 2 TIMES DAILY
Qty: 80 G | Refills: 1 | Status: SHIPPED | OUTPATIENT
Start: 2023-10-27

## 2023-10-27 NOTE — TELEPHONE ENCOUNTER
Rx Refill Note  Requested Prescriptions     Pending Prescriptions Disp Refills    triamcinolone (KENALOG) 0.1 % cream [Pharmacy Med Name: TRIAMCINOLON CRE 0.1%] 80 g 1     Sig: Apply  topically to the appropriate area as directed 2 (Two) Times a Day.      Last office visit with prescribing clinician: 9/12/2023   Last telemedicine visit with prescribing clinician: 6/23/2023   Next office visit with prescribing clinician: 12/12/2023                         Would you like a call back once the refill request has been completed: [] Yes [] No    If the office needs to give you a call back, can they leave a voicemail: [] Yes [] No    Rodo Quiñonez, PCT  10/27/23, 09:34 EDT

## 2023-10-30 DIAGNOSIS — M79.605 PAIN IN BOTH LOWER EXTREMITIES: ICD-10-CM

## 2023-10-30 DIAGNOSIS — M79.604 PAIN IN BOTH LOWER EXTREMITIES: ICD-10-CM

## 2023-10-30 NOTE — TELEPHONE ENCOUNTER
Rx Refill Note  Requested Prescriptions     Pending Prescriptions Disp Refills    gabapentin (NEURONTIN) 300 MG capsule [Pharmacy Med Name: GABAPENTIN 300MG] 60 capsule 0     Sig: TAKE 1 CAPSULE BY MOUTH 2 (TWO) TIMES A DAY.      Last office visit with prescribing clinician: 9/12/2023   Last telemedicine visit with prescribing clinician: 6/23/2023   Next office visit with prescribing clinician: 12/12/2023                         Would you like a call back once the refill request has been completed: [] Yes [] No    If the office needs to give you a call back, can they leave a voicemail: [] Yes [] No    Deisy Carrillo MA  10/30/23, 09:57 EDT

## 2023-11-01 RX ORDER — GABAPENTIN 300 MG/1
300 CAPSULE ORAL 2 TIMES DAILY
Qty: 60 CAPSULE | Refills: 0 | Status: SHIPPED | OUTPATIENT
Start: 2023-11-01

## 2023-12-01 DIAGNOSIS — E11.9 TYPE 2 DIABETES MELLITUS WITHOUT COMPLICATION, WITHOUT LONG-TERM CURRENT USE OF INSULIN: ICD-10-CM

## 2023-12-04 RX ORDER — SEMAGLUTIDE 0.68 MG/ML
INJECTION, SOLUTION SUBCUTANEOUS
Qty: 3 ML | Refills: 0 | Status: SHIPPED | OUTPATIENT
Start: 2023-12-04

## 2023-12-21 ENCOUNTER — OFFICE VISIT (OUTPATIENT)
Dept: INTERNAL MEDICINE | Facility: CLINIC | Age: 73
End: 2023-12-21
Payer: MEDICARE

## 2023-12-21 VITALS
BODY MASS INDEX: 40.35 KG/M2 | TEMPERATURE: 98 F | DIASTOLIC BLOOD PRESSURE: 72 MMHG | SYSTOLIC BLOOD PRESSURE: 116 MMHG | WEIGHT: 242.2 LBS | HEART RATE: 76 BPM | HEIGHT: 65 IN | OXYGEN SATURATION: 95 %

## 2023-12-21 DIAGNOSIS — K59.04 CHRONIC IDIOPATHIC CONSTIPATION: ICD-10-CM

## 2023-12-21 DIAGNOSIS — Z78.0 POST-MENOPAUSE: ICD-10-CM

## 2023-12-21 DIAGNOSIS — L08.9 BLISTER OF RIGHT HEEL WITH INFECTION, INITIAL ENCOUNTER: ICD-10-CM

## 2023-12-21 DIAGNOSIS — F51.01 PRIMARY INSOMNIA: ICD-10-CM

## 2023-12-21 DIAGNOSIS — E11.9 TYPE 2 DIABETES MELLITUS WITHOUT COMPLICATION, WITHOUT LONG-TERM CURRENT USE OF INSULIN: Primary | ICD-10-CM

## 2023-12-21 DIAGNOSIS — S90.821A BLISTER OF RIGHT HEEL WITH INFECTION, INITIAL ENCOUNTER: ICD-10-CM

## 2023-12-21 DIAGNOSIS — E03.9 ACQUIRED HYPOTHYROIDISM: ICD-10-CM

## 2023-12-21 DIAGNOSIS — M79.604 PAIN IN BOTH LOWER EXTREMITIES: ICD-10-CM

## 2023-12-21 DIAGNOSIS — M1A.9XX0 CHRONIC GOUT INVOLVING TOE WITHOUT TOPHUS, UNSPECIFIED CAUSE, UNSPECIFIED LATERALITY: ICD-10-CM

## 2023-12-21 DIAGNOSIS — M79.605 PAIN IN BOTH LOWER EXTREMITIES: ICD-10-CM

## 2023-12-21 RX ORDER — TRAZODONE HYDROCHLORIDE 50 MG/1
50 TABLET ORAL NIGHTLY
Qty: 90 TABLET | Refills: 1 | Status: SHIPPED | OUTPATIENT
Start: 2023-12-21

## 2023-12-21 RX ORDER — COLCHICINE 0.6 MG/1
0.6 TABLET ORAL DAILY
Qty: 5 TABLET | Refills: 6 | Status: SHIPPED | OUTPATIENT
Start: 2023-12-21

## 2023-12-21 RX ORDER — AMOXICILLIN 250 MG
CAPSULE ORAL
Qty: 30 TABLET | Refills: 3 | Status: SHIPPED | OUTPATIENT
Start: 2023-12-21

## 2023-12-21 RX ORDER — GABAPENTIN 300 MG/1
300 CAPSULE ORAL 2 TIMES DAILY
Qty: 180 CAPSULE | Refills: 0 | Status: SHIPPED | OUTPATIENT
Start: 2023-12-21

## 2023-12-21 NOTE — PROGRESS NOTES
Judith Tenorio is a 73 y.o. female who presents with a chief complaint of   Chief Complaint   Patient presents with    Diabetes     F/U  Is asking for a number for her medicine so they refill with the rest.       HPI     Needs a new walker (regular size)--paper script      The following portions of the patient's history were reviewed and updated as appropriate: allergies, current medications, past family history, past medical history, past social history, past surgical history and problem list.      Current Outpatient Medications:     albuterol (PROVENTIL) (2.5 MG/3ML) 0.083% nebulizer solution, Take 2.5 mg by nebulization Every 4 (Four) Hours As Needed for Wheezing., Disp: 3 mL, Rfl: 12    albuterol sulfate  (90 Base) MCG/ACT inhaler, Inhale 2 puffs Every 4 (Four) Hours As Needed for Wheezing., Disp: 8 g, Rfl: 3    allopurinol (ZYLOPRIM) 300 MG tablet, TAKE 1 TABLET BY MOUTH ONCE DAILY, Disp: 90 tablet, Rfl: 3    atorvastatin (LIPITOR) 40 MG tablet, Take 1 tablet by mouth Daily., Disp: 90 tablet, Rfl: 2    benzonatate (TESSALON) 100 MG capsule, Take 1 capsule by mouth 3 (Three) Times a Day As Needed for Cough., Disp: 45 capsule, Rfl: 0    budesonide-formoterol (Symbicort) 160-4.5 MCG/ACT inhaler, Inhale 2 puffs 2 (Two) Times a Day., Disp: 6 g, Rfl: 6    cetirizine (zyrTEC) 10 MG tablet, TAKE 1 TABLET BY MOUTH ONCE DAILY, Disp: 90 tablet, Rfl: 3    diclofenac (VOLTAREN) 75 MG EC tablet, Take 1 tablet by mouth 2 (Two) Times a Day., Disp: 180 tablet, Rfl: 1    fluticasone (FLONASE) 50 MCG/ACT nasal spray, 1 spray into the nostril(s) as directed by provider Daily., Disp: 15.8 mL, Rfl: 3    furosemide (LASIX) 20 MG tablet, Take 1 tablet by mouth 3 (Three) Times a Week., Disp: 36 tablet, Rfl: 3    gabapentin (NEURONTIN) 300 MG capsule, Take 1 capsule by mouth 2 (Two) Times a Day., Disp: 180 capsule, Rfl: 0    Insulin Pen Needle (Pen Needles) 31G X 5 MM misc, Place 1 each on the skin as directed by  "provider 1 (One) Time Per Week., Disp: 100 each, Rfl: 1    Lancet Devices (Leader Advanced Lancing Device) misc, AS DIRECTED, Disp: 1 each, Rfl: 0    levothyroxine (Synthroid) 112 MCG tablet, Take 1 tablet by mouth Daily., Disp: 90 tablet, Rfl: 3    montelukast (SINGULAIR) 10 MG tablet, Take 1 tablet by mouth Daily., Disp: 90 tablet, Rfl: 3    triamcinolone (KENALOG) 0.1 % cream, APPLY TOPICALLY TO THE APPROPRIATE AREA AS DIRECTED 2 (TWO) TIMES A DAY., Disp: 80 g, Rfl: 1    True Metrix Blood Glucose Test test strip, CHECK BLOOD SUGAR ONCE A DAY FOR DIAGNOSIS OF TYPE II DIABETES, Disp: 100 each, Rfl: 10    TRUEplus Lancets 30G misc, CHECK BLOOD SUGAR ONCE A DAY FOR DIAGNOSIS OF TYPE II DIABETES E11.9, Disp: 100 each, Rfl: 5    colchicine 0.6 MG tablet, Take 1 tablet by mouth Daily. Take only when you are having a gout flare., Disp: 5 tablet, Rfl: 6    hydrOXYzine (ATARAX) 25 MG tablet, , Disp: , Rfl:     mupirocin (BACTROBAN) 2 % ointment, Apply 1 application  topically to the appropriate area as directed 3 (Three) Times a Day., Disp: 30 g, Rfl: 1    Semaglutide, 1 MG/DOSE, (OZEMPIC) 4 MG/3ML solution pen-injector, Inject 1 mg under the skin into the appropriate area as directed 1 (One) Time Per Week., Disp: 3 mL, Rfl: 6    sennosides-docusate (senna-docusate sodium) 8.6-50 MG per tablet, Use as needed for when you are constipated., Disp: 30 tablet, Rfl: 3    traZODone (DESYREL) 50 MG tablet, Take 1 tablet by mouth Every Night., Disp: 90 tablet, Rfl: 1            Physical Exam  /72 (BP Location: Left arm, Patient Position: Sitting, Cuff Size: Large Adult)   Pulse 76   Temp 98 °F (36.7 °C) (Infrared)   Ht 165.1 cm (65\")   Wt 110 kg (242 lb 3.2 oz)   SpO2 95%   BMI 40.30 kg/m²     Physical Exam  Vitals reviewed.   Constitutional:       General: She is not in acute distress.     Appearance: Normal appearance.   HENT:      Head: Normocephalic and atraumatic.      Nose: Nose normal.      Mouth/Throat:      " Mouth: Mucous membranes are moist.   Eyes:      Conjunctiva/sclera: Conjunctivae normal.   Pulmonary:      Effort: Pulmonary effort is normal.   Skin:     General: Skin is warm and dry.   Neurological:      General: No focal deficit present.      Mental Status: She is alert.   Psychiatric:         Mood and Affect: Mood normal.           Results for orders placed or performed in visit on 12/21/23   Hemoglobin A1c    Specimen: Blood   Result Value Ref Range    Hemoglobin A1C 6.30 (H) 4.80 - 5.60 %   CBC (No Diff)    Specimen: Blood   Result Value Ref Range    WBC 7.92 3.40 - 10.80 10*3/mm3    RBC 4.28 3.77 - 5.28 10*6/mm3    Hemoglobin 13.7 12.0 - 15.9 g/dL    Hematocrit 40.1 34.0 - 46.6 %    MCV 93.7 79.0 - 97.0 fL    MCH 32.0 26.6 - 33.0 pg    MCHC 34.2 31.5 - 35.7 g/dL    RDW 13.7 12.3 - 15.4 %    Platelets 192 140 - 450 10*3/mm3   TSH    Specimen: Blood   Result Value Ref Range    TSH 6.320 (H) 0.270 - 4.200 uIU/mL   T4, free    Specimen: Blood   Result Value Ref Range    Free T4 1.12 0.93 - 1.70 ng/dL           Diagnoses and all orders for this visit:    1. Type 2 diabetes mellitus without complication, without long-term current use of insulin (Primary)  -     Semaglutide, 1 MG/DOSE, (OZEMPIC) 4 MG/3ML solution pen-injector; Inject 1 mg under the skin into the appropriate area as directed 1 (One) Time Per Week.  Dispense: 3 mL; Refill: 6  -     Hemoglobin A1c    2. Blister of right heel with infection, initial encounter  -     mupirocin (BACTROBAN) 2 % ointment; Apply 1 application  topically to the appropriate area as directed 3 (Three) Times a Day.  Dispense: 30 g; Refill: 1  -     CBC (No Diff)    3. Chronic gout involving toe without tophus, unspecified cause, unspecified laterality  -     colchicine 0.6 MG tablet; Take 1 tablet by mouth Daily. Take only when you are having a gout flare.  Dispense: 5 tablet; Refill: 6  -     CBC (No Diff)    4. Chronic idiopathic constipation  -     sennosides-docusate  (senna-docusate sodium) 8.6-50 MG per tablet; Use as needed for when you are constipated.  Dispense: 30 tablet; Refill: 3  -     CBC (No Diff)    5. Pain in both lower extremities  -     gabapentin (NEURONTIN) 300 MG capsule; Take 1 capsule by mouth 2 (Two) Times a Day.  Dispense: 180 capsule; Refill: 0    6. Primary insomnia  -     traZODone (DESYREL) 50 MG tablet; Take 1 tablet by mouth Every Night.  Dispense: 90 tablet; Refill: 1  -     TSH  -     T4, free    7. Post-menopause  -     DEXA Bone Density Axial    8. Acquired hypothyroidism  -     TSH  -     T4, free        6 months order colonoscopy.  She declines today.

## 2023-12-22 LAB
ERYTHROCYTE [DISTWIDTH] IN BLOOD BY AUTOMATED COUNT: 13.7 % (ref 12.3–15.4)
HBA1C MFR BLD: 6.3 % (ref 4.8–5.6)
HCT VFR BLD AUTO: 40.1 % (ref 34–46.6)
HGB BLD-MCNC: 13.7 G/DL (ref 12–15.9)
MCH RBC QN AUTO: 32 PG (ref 26.6–33)
MCHC RBC AUTO-ENTMCNC: 34.2 G/DL (ref 31.5–35.7)
MCV RBC AUTO: 93.7 FL (ref 79–97)
PLATELET # BLD AUTO: 192 10*3/MM3 (ref 140–450)
RBC # BLD AUTO: 4.28 10*6/MM3 (ref 3.77–5.28)
T4 FREE SERPL-MCNC: 1.12 NG/DL (ref 0.93–1.7)
TSH SERPL DL<=0.005 MIU/L-ACNC: 6.32 UIU/ML (ref 0.27–4.2)
WBC # BLD AUTO: 7.92 10*3/MM3 (ref 3.4–10.8)

## 2023-12-27 DIAGNOSIS — E11.9 TYPE 2 DIABETES MELLITUS WITHOUT COMPLICATION, WITHOUT LONG-TERM CURRENT USE OF INSULIN: ICD-10-CM

## 2023-12-27 NOTE — TELEPHONE ENCOUNTER
Dose was changed on 12/21. This is the previous dose.    Rx Refill Note  Requested Prescriptions     Pending Prescriptions Disp Refills    Ozempic, 0.25 or 0.5 MG/DOSE, 2 MG/3ML solution pen-injector [Pharmacy Med Name: OZEMPIC 2 MG/3ML INJECTION] 3 mL 0     Sig: INJECT 0.5 MG ONCE WEEKLY.      Last office visit with prescribing clinician: 12/21/2023   Last telemedicine visit with prescribing clinician: Visit date not found   Next office visit with prescribing clinician: 3/21/2024                         Would you like a call back once the refill request has been completed: [] Yes [] No    If the office needs to give you a call back, can they leave a voicemail: [] Yes [] No    Rodo Quiñonez, PCT  12/27/23, 08:03 EST

## 2023-12-28 RX ORDER — SEMAGLUTIDE 0.68 MG/ML
INJECTION, SOLUTION SUBCUTANEOUS
Qty: 3 ML | Refills: 0 | OUTPATIENT
Start: 2023-12-28

## 2023-12-29 DIAGNOSIS — J30.9 ALLERGIC RHINITIS, UNSPECIFIED SEASONALITY, UNSPECIFIED TRIGGER: ICD-10-CM

## 2023-12-29 DIAGNOSIS — J45.21 MILD INTERMITTENT ASTHMA WITH ACUTE EXACERBATION: ICD-10-CM

## 2023-12-29 RX ORDER — ALBUTEROL SULFATE 90 UG/1
2 AEROSOL, METERED RESPIRATORY (INHALATION) EVERY 4 HOURS PRN
Qty: 8.5 G | Refills: 2 | Status: SHIPPED | OUTPATIENT
Start: 2023-12-29

## 2023-12-29 RX ORDER — FLUTICASONE PROPIONATE 50 MCG
1 SPRAY, SUSPENSION (ML) NASAL DAILY
Qty: 16 G | Refills: 2 | Status: SHIPPED | OUTPATIENT
Start: 2023-12-29

## 2024-02-01 RX ORDER — ATORVASTATIN CALCIUM 40 MG/1
40 TABLET, FILM COATED ORAL DAILY
Qty: 90 TABLET | Refills: 0 | Status: SHIPPED | OUTPATIENT
Start: 2024-02-01

## 2024-02-01 NOTE — TELEPHONE ENCOUNTER
Rx Refill Note  Requested Prescriptions     Pending Prescriptions Disp Refills    atorvastatin (LIPITOR) 40 MG tablet [Pharmacy Med Name: ATORVASTATIN 40MG] 90 tablet 0     Sig: TAKE 1 TABLET BY MOUTH ONCE DAILY      Last office visit with prescribing clinician: 12/21/2023   Last telemedicine visit with prescribing clinician: Visit date not found   Next office visit with prescribing clinician: 3/21/2024                         Would you like a call back once the refill request has been completed: [] Yes [] No    If the office needs to give you a call back, can they leave a voicemail: [] Yes [] No    Huong Hinson [facility]  02/01/24, 10:45 EST

## 2024-02-15 ENCOUNTER — TELEPHONE (OUTPATIENT)
Dept: INTERNAL MEDICINE | Facility: CLINIC | Age: 74
End: 2024-02-15
Payer: MEDICARE

## 2024-02-19 ENCOUNTER — OFFICE VISIT (OUTPATIENT)
Dept: INTERNAL MEDICINE | Facility: CLINIC | Age: 74
End: 2024-02-19
Payer: MEDICARE

## 2024-02-19 VITALS
DIASTOLIC BLOOD PRESSURE: 74 MMHG | TEMPERATURE: 97.8 F | WEIGHT: 242.8 LBS | HEIGHT: 65 IN | SYSTOLIC BLOOD PRESSURE: 112 MMHG | HEART RATE: 75 BPM | BODY MASS INDEX: 40.45 KG/M2 | OXYGEN SATURATION: 97 %

## 2024-02-19 DIAGNOSIS — J01.10 ACUTE NON-RECURRENT FRONTAL SINUSITIS: Primary | ICD-10-CM

## 2024-02-19 DIAGNOSIS — M47.816 SPONDYLOSIS OF LUMBAR REGION WITHOUT MYELOPATHY OR RADICULOPATHY: ICD-10-CM

## 2024-02-19 PROCEDURE — 3078F DIAST BP <80 MM HG: CPT | Performed by: INTERNAL MEDICINE

## 2024-02-19 PROCEDURE — 1159F MED LIST DOCD IN RCRD: CPT | Performed by: INTERNAL MEDICINE

## 2024-02-19 PROCEDURE — 1160F RVW MEDS BY RX/DR IN RCRD: CPT | Performed by: INTERNAL MEDICINE

## 2024-02-19 PROCEDURE — 3074F SYST BP LT 130 MM HG: CPT | Performed by: INTERNAL MEDICINE

## 2024-02-19 PROCEDURE — 99214 OFFICE O/P EST MOD 30 MIN: CPT | Performed by: INTERNAL MEDICINE

## 2024-02-19 RX ORDER — AMOXICILLIN 875 MG/1
875 TABLET, COATED ORAL 2 TIMES DAILY
Qty: 14 TABLET | Refills: 0 | Status: SHIPPED | OUTPATIENT
Start: 2024-02-19

## 2024-02-19 NOTE — PROGRESS NOTES
Judith Tenorio is a 73 y.o. female who presents with a chief complaint of   Chief Complaint   Patient presents with    Facial Pain     C/O sinus pressure, X 2 weeks       Facial Pain         Facial pressure x2 weeks.  PND, congestion.  No vomiting, diarrhea, fevers.     Just wants regular rolator.       The following portions of the patient's history were reviewed and updated as appropriate: allergies, current medications, past family history, past medical history, past social history, past surgical history and problem list.      Current Outpatient Medications:     albuterol (PROVENTIL) (2.5 MG/3ML) 0.083% nebulizer solution, Take 2.5 mg by nebulization Every 4 (Four) Hours As Needed for Wheezing., Disp: 3 mL, Rfl: 12    albuterol sulfate  (90 Base) MCG/ACT inhaler, INHALE 2 PUFFS EVERY 4 (FOUR) HOURS AS NEEDED FOR WHEEZING., Disp: 8.5 g, Rfl: 2    allopurinol (ZYLOPRIM) 300 MG tablet, TAKE 1 TABLET BY MOUTH ONCE DAILY, Disp: 90 tablet, Rfl: 3    atorvastatin (LIPITOR) 40 MG tablet, TAKE 1 TABLET BY MOUTH ONCE DAILY, Disp: 90 tablet, Rfl: 0    benzonatate (TESSALON) 100 MG capsule, Take 1 capsule by mouth 3 (Three) Times a Day As Needed for Cough., Disp: 45 capsule, Rfl: 0    budesonide-formoterol (Symbicort) 160-4.5 MCG/ACT inhaler, Inhale 2 puffs 2 (Two) Times a Day., Disp: 6 g, Rfl: 6    cetirizine (zyrTEC) 10 MG tablet, TAKE 1 TABLET BY MOUTH ONCE DAILY, Disp: 90 tablet, Rfl: 3    colchicine 0.6 MG tablet, Take 1 tablet by mouth Daily. Take only when you are having a gout flare., Disp: 5 tablet, Rfl: 6    diclofenac (VOLTAREN) 75 MG EC tablet, Take 1 tablet by mouth 2 (Two) Times a Day., Disp: 180 tablet, Rfl: 1    fluticasone (FLONASE) 50 MCG/ACT nasal spray, USE 1 SPRAY IN EACH NOSTRIL ONCE DAILY, Disp: 16 g, Rfl: 2    furosemide (LASIX) 20 MG tablet, Take 1 tablet by mouth 3 (Three) Times a Week., Disp: 36 tablet, Rfl: 3    gabapentin (NEURONTIN) 300 MG capsule, Take 1 capsule by mouth 2  "(Two) Times a Day., Disp: 180 capsule, Rfl: 0    hydrOXYzine (ATARAX) 25 MG tablet, , Disp: , Rfl:     Insulin Pen Needle (Pen Needles) 31G X 5 MM misc, Place 1 each on the skin as directed by provider 1 (One) Time Per Week., Disp: 100 each, Rfl: 1    Lancet Devices (Leader Advanced Lancing Device) misc, AS DIRECTED, Disp: 1 each, Rfl: 0    levothyroxine (Synthroid) 112 MCG tablet, Take 1 tablet by mouth Daily., Disp: 90 tablet, Rfl: 3    montelukast (SINGULAIR) 10 MG tablet, Take 1 tablet by mouth Daily., Disp: 90 tablet, Rfl: 3    mupirocin (BACTROBAN) 2 % ointment, Apply 1 application  topically to the appropriate area as directed 3 (Three) Times a Day., Disp: 30 g, Rfl: 1    Semaglutide, 1 MG/DOSE, (OZEMPIC) 4 MG/3ML solution pen-injector, Inject 1 mg under the skin into the appropriate area as directed 1 (One) Time Per Week., Disp: 3 mL, Rfl: 6    sennosides-docusate (senna-docusate sodium) 8.6-50 MG per tablet, Use as needed for when you are constipated., Disp: 30 tablet, Rfl: 3    traZODone (DESYREL) 50 MG tablet, Take 1 tablet by mouth Every Night., Disp: 90 tablet, Rfl: 1    triamcinolone (KENALOG) 0.1 % cream, APPLY TOPICALLY TO THE APPROPRIATE AREA AS DIRECTED 2 (TWO) TIMES A DAY., Disp: 80 g, Rfl: 1    True Metrix Blood Glucose Test test strip, CHECK BLOOD SUGAR ONCE A DAY FOR DIAGNOSIS OF TYPE II DIABETES, Disp: 100 each, Rfl: 10    TRUEplus Lancets 30G misc, CHECK BLOOD SUGAR ONCE A DAY FOR DIAGNOSIS OF TYPE II DIABETES E11.9, Disp: 100 each, Rfl: 5    amoxicillin (AMOXIL) 875 MG tablet, Take 1 tablet by mouth 2 (Two) Times a Day., Disp: 14 tablet, Rfl: 0            Physical Exam  /74 (BP Location: Left arm, Patient Position: Sitting, Cuff Size: Large Adult)   Pulse 75   Temp 97.8 °F (36.6 °C) (Infrared)   Ht 165.1 cm (65\")   Wt 110 kg (242 lb 12.8 oz)   SpO2 97%   BMI 40.40 kg/m²     Physical Exam  Vitals reviewed.   Constitutional:       General: She is not in acute distress.     " Appearance: Normal appearance.   HENT:      Head: Normocephalic and atraumatic.      Nose: Nose normal.      Mouth/Throat:      Mouth: Mucous membranes are moist.   Eyes:      Conjunctiva/sclera: Conjunctivae normal.   Pulmonary:      Effort: Pulmonary effort is normal.   Skin:     General: Skin is warm and dry.   Neurological:      General: No focal deficit present.      Mental Status: She is alert.   Psychiatric:         Mood and Affect: Mood normal.           Results for orders placed or performed in visit on 12/21/23   Hemoglobin A1c    Specimen: Blood   Result Value Ref Range    Hemoglobin A1C 6.30 (H) 4.80 - 5.60 %   CBC (No Diff)    Specimen: Blood   Result Value Ref Range    WBC 7.92 3.40 - 10.80 10*3/mm3    RBC 4.28 3.77 - 5.28 10*6/mm3    Hemoglobin 13.7 12.0 - 15.9 g/dL    Hematocrit 40.1 34.0 - 46.6 %    MCV 93.7 79.0 - 97.0 fL    MCH 32.0 26.6 - 33.0 pg    MCHC 34.2 31.5 - 35.7 g/dL    RDW 13.7 12.3 - 15.4 %    Platelets 192 140 - 450 10*3/mm3   TSH    Specimen: Blood   Result Value Ref Range    TSH 6.320 (H) 0.270 - 4.200 uIU/mL   T4, free    Specimen: Blood   Result Value Ref Range    Free T4 1.12 0.93 - 1.70 ng/dL           Diagnoses and all orders for this visit:    1. Acute non-recurrent frontal sinusitis (Primary)  -     amoxicillin (AMOXIL) 875 MG tablet; Take 1 tablet by mouth 2 (Two) Times a Day.  Dispense: 14 tablet; Refill: 0    2. Spondylosis of lumbar region without myelopathy or radiculopathy    Treat sinusitis.     Arthitis of back. Cannot use regular walker due to pain so has to use rolator.  Rolator allows mobility and ability to stay mobile and healthy to prevent worsening of diabetes.

## 2024-03-05 DIAGNOSIS — M17.12 PRIMARY OSTEOARTHRITIS OF LEFT KNEE: ICD-10-CM

## 2024-03-05 NOTE — TELEPHONE ENCOUNTER
Rx Refill Note  Requested Prescriptions     Pending Prescriptions Disp Refills    diclofenac (VOLTAREN) 75 MG EC tablet [Pharmacy Med Name: DICLOFENAC 75MG DR] 180 tablet 0     Sig: TAKE 1 TABLET BY MOUTH TWICE DAILY      Last office visit with prescribing clinician: 2/19/2024   Last telemedicine visit with prescribing clinician: Visit date not found   Next office visit with prescribing clinician: 3/21/2024                         Would you like a call back once the refill request has been completed: [] Yes [] No    If the office needs to give you a call back, can they leave a voicemail: [] Yes [] No    Huong Hinson CMA  03/05/24, 11:02 EST

## 2024-03-08 RX ORDER — DICLOFENAC SODIUM 75 MG/1
75 TABLET, DELAYED RELEASE ORAL 2 TIMES DAILY
Qty: 180 TABLET | Refills: 1 | Status: SHIPPED | OUTPATIENT
Start: 2024-03-08

## 2024-03-15 NOTE — TELEPHONE ENCOUNTER
Rx Refill Note  Requested Prescriptions     Pending Prescriptions Disp Refills    cetirizine (zyrTEC) 10 MG tablet [Pharmacy Med Name: CETIRIZINE 10MG] 90 tablet 2     Sig: TAKE 1 TABLET BY MOUTH ONCE DAILY      Last office visit with prescribing clinician: 2/19/2024   Last telemedicine visit with prescribing clinician: Visit date not found   Next office visit with prescribing clinician: 3/21/2024                         Would you like a call back once the refill request has been completed: [] Yes [] No    If the office needs to give you a call back, can they leave a voicemail: [] Yes [] No    Alisha Florian MA  03/15/24, 17:27 EDT

## 2024-03-17 RX ORDER — CETIRIZINE HYDROCHLORIDE 10 MG/1
TABLET ORAL
Qty: 90 TABLET | Refills: 3 | Status: SHIPPED | OUTPATIENT
Start: 2024-03-17

## 2024-03-29 DIAGNOSIS — J45.21 MILD INTERMITTENT ASTHMA WITH ACUTE EXACERBATION: ICD-10-CM

## 2024-03-29 RX ORDER — ALBUTEROL SULFATE 90 UG/1
2 AEROSOL, METERED RESPIRATORY (INHALATION) EVERY 4 HOURS PRN
Qty: 8.5 G | Refills: 1 | Status: SHIPPED | OUTPATIENT
Start: 2024-03-29

## 2024-04-01 DIAGNOSIS — J45.21 MILD INTERMITTENT ASTHMA WITH ACUTE EXACERBATION: ICD-10-CM

## 2024-04-01 DIAGNOSIS — J30.9 ALLERGIC RHINITIS, UNSPECIFIED SEASONALITY, UNSPECIFIED TRIGGER: ICD-10-CM

## 2024-04-01 RX ORDER — FLUTICASONE PROPIONATE 50 MCG
1 SPRAY, SUSPENSION (ML) NASAL DAILY
Qty: 16 G | Refills: 1 | Status: SHIPPED | OUTPATIENT
Start: 2024-04-01

## 2024-04-01 RX ORDER — BUDESONIDE AND FORMOTEROL FUMARATE DIHYDRATE 160; 4.5 UG/1; UG/1
2 AEROSOL RESPIRATORY (INHALATION)
Qty: 10.2 G | Refills: 5 | Status: SHIPPED | OUTPATIENT
Start: 2024-04-01

## 2024-04-01 NOTE — TELEPHONE ENCOUNTER
Rx Refill Note  Requested Prescriptions     Pending Prescriptions Disp Refills    budesonide-formoterol (SYMBICORT) 160-4.5 MCG/ACT inhaler [Pharmacy Med Name: BUDESONIDE/FORMOTEROL FUM AEROSOL] 10.2 g 5     Sig: Inhale 2 puffs 2 (Two) Times a Day.    fluticasone (FLONASE) 50 MCG/ACT nasal spray [Pharmacy Med Name: FLUTICASONE SPR 50MCG] 16 g 1     Sig: USE 1 SPRAY IN EACH NOSTRIL ONCE DAILY      Last office visit with prescribing clinician: 2/19/2024   Last telemedicine visit with prescribing clinician: Visit date not found   Next office visit with prescribing clinician: Visit date not found                         Would you like a call back once the refill request has been completed: [] Yes [] No    If the office needs to give you a call back, can they leave a voicemail: [] Yes [] No    Rodo Mcdaniel MA  04/01/24, 08:54 EDT

## 2024-05-07 ENCOUNTER — TELEPHONE (OUTPATIENT)
Dept: INTERNAL MEDICINE | Facility: CLINIC | Age: 74
End: 2024-05-07

## 2024-05-07 NOTE — TELEPHONE ENCOUNTER
Caller: [facility]    Relationship to patient: Other    Best call back number: [phone]    PLEASE ADVISE IF A FAX HAS BEEN RECEIVED. THIS WAS SENT ON 05/03.

## 2024-05-09 RX ORDER — ATORVASTATIN CALCIUM 40 MG/1
40 TABLET, FILM COATED ORAL DAILY
Qty: 90 TABLET | Refills: 3 | Status: SHIPPED | OUTPATIENT
Start: 2024-05-09

## 2024-05-09 NOTE — TELEPHONE ENCOUNTER
Spoke to Hayes at [facility] to let him know that the fax they were asking about has angelo received, and PCP should be able to get to it shortly.

## 2024-05-30 ENCOUNTER — TELEPHONE (OUTPATIENT)
Dept: INTERNAL MEDICINE | Facility: CLINIC | Age: 74
End: 2024-05-30

## 2024-05-30 NOTE — TELEPHONE ENCOUNTER
Caller: [facility]    Relationship: MOBILITY BACK BRACE    Best call back number: [phone]     Who are you requesting to speak with (clinical staff, provider,  specific staff member): CLINICAL STAFF    What was the call regarding: STATED THAT THEY WERE NEEDING TO SEE ABOUT GETTING THE ORDER FOR A MOBILITY BACK BRACE RESENT AS THE SIGNATURE AND DATE WERE BLURRY ON THE LAST FORM. PLEASE CALL AND ADVISE

## 2024-06-03 NOTE — TELEPHONE ENCOUNTER
Caller: [facility]    Relationship: BACK BRACE SUPPLIER    Best call back number: [phone]    Who are you requesting to speak with (clinical staff, provider,  specific staff member): CLINICAL STAFF    What was the call regarding: STATED THAT THEY HAVE NOT HEARD ANYTHING BACK YET ABOUT THE REQUEST. STATED THAT THEY WOULD LIKE A CALL BACK TO DISCUSS WHEN AVAILABLE. PLEASE CALL AND ADVISE

## 2024-06-07 NOTE — TELEPHONE ENCOUNTER
Caller: [facility]    Relationship: OTHER    Best call back number: [phone]    What form or medical record are you requesting: CLINICAL NOTES TO SUPPORT LOWER BACK PAIN, NEED FOR BACK BRACE     Who is requesting this form or medical record from you: [facility]     How would you like to receive the form or medical records (pick-up, mail, fax): FAX  If fax, what is the fax number: [phone]      Timeframe paperwork needed: ASAP    Additional notes: [facility] SAID THE DOCUMENTATION THEY HAVE DOES NOT SUPPORT THE NEED FOR A BACK BRACE, THEY NEED OFFICE NOTES WHERE BACK PAIN WAS DISCUSSED

## 2024-06-13 DIAGNOSIS — F51.01 PRIMARY INSOMNIA: ICD-10-CM

## 2024-06-13 RX ORDER — TRAZODONE HYDROCHLORIDE 50 MG/1
50 TABLET ORAL NIGHTLY
Qty: 90 TABLET | Refills: 0 | Status: SHIPPED | OUTPATIENT
Start: 2024-06-13

## 2024-06-13 NOTE — TELEPHONE ENCOUNTER
Caller: [facility]    Relationship to patient: OTHER    Best call back number: [phone]    Patient is needing: THE NOTES THAT THEY RECEIVED WERE INSUFFICIENT, THEY NEED NOTES ON A PHYSICAL EXAM THAT WAS DONE ON THE PATIENTS BACK. PLEASE ADVISE.

## 2024-06-21 RX ORDER — ALLOPURINOL 300 MG/1
TABLET ORAL
Qty: 90 TABLET | Refills: 2 | Status: SHIPPED | OUTPATIENT
Start: 2024-06-21

## 2024-06-24 NOTE — TELEPHONE ENCOUNTER
Called to schedule appt and daughter in law answered stating that patient is in the hospital having gal bladder removed. Would let her know that we called.

## 2024-07-02 ENCOUNTER — TELEPHONE (OUTPATIENT)
Dept: INTERNAL MEDICINE | Facility: CLINIC | Age: 74
End: 2024-07-02
Payer: MEDICARE

## 2024-07-02 NOTE — TELEPHONE ENCOUNTER
Caller: Judith Tenorio    Relationship to patient: Self    Best call back number: [phone] OR DAUGHTER      New or established patient?  [] New  [x] Established    Date of discharge: 6/28/24     Facility [facility] from: [facility]     Diagnosis/Symptoms: STENTS PLACED

## 2024-07-03 NOTE — TELEPHONE ENCOUNTER
OK FOR HUB TO READ.  LVM for patient. Scheduled her for 07/12/24 at 11:45am. This is the only date & time that she has open.

## 2024-07-22 DIAGNOSIS — E11.9 TYPE 2 DIABETES MELLITUS WITHOUT COMPLICATION, WITHOUT LONG-TERM CURRENT USE OF INSULIN: ICD-10-CM

## 2024-07-30 RX ORDER — SEMAGLUTIDE 1.34 MG/ML
INJECTION, SOLUTION SUBCUTANEOUS
Qty: 3 ML | Refills: 0 | Status: SHIPPED | OUTPATIENT
Start: 2024-07-30

## 2024-07-31 ENCOUNTER — OUTSIDE FACILITY SERVICE (OUTPATIENT)
Dept: INTERNAL MEDICINE | Facility: CLINIC | Age: 74
End: 2024-07-31
Payer: MEDICARE

## 2024-08-06 ENCOUNTER — TELEPHONE (OUTPATIENT)
Dept: INTERNAL MEDICINE | Facility: CLINIC | Age: 74
End: 2024-08-06

## 2024-08-06 NOTE — TELEPHONE ENCOUNTER
Caller: JOSEFA    Relationship: [facility]    Best call back number:     What orders are you requesting (i.e. lab or imaging): PHYSICAL THERAPY AND OCCUPATIONAL THERAPY    In what timeframe would the patient need to come in:     Where will you receive your lab/imaging services:     Additional notes: JOSEFA WITH [facility] IS CALLING IN TO REQUEST A EXTENSION OF THE PATIENTS PHYSICAL THERAPY AND OCCUPATIONAL THERAPY FOR ONCE A WEEK FOR THREE WEEKS.

## 2024-08-15 DIAGNOSIS — J30.1 SEASONAL ALLERGIC RHINITIS DUE TO POLLEN: ICD-10-CM

## 2024-08-15 DIAGNOSIS — E03.9 ACQUIRED HYPOTHYROIDISM: ICD-10-CM

## 2024-08-15 RX ORDER — MONTELUKAST SODIUM 10 MG/1
10 TABLET ORAL DAILY
Qty: 90 TABLET | Refills: 2 | Status: SHIPPED | OUTPATIENT
Start: 2024-08-15

## 2024-08-15 RX ORDER — LEVOTHYROXINE SODIUM 112 UG/1
112 TABLET ORAL DAILY
Qty: 90 TABLET | Refills: 2 | Status: SHIPPED | OUTPATIENT
Start: 2024-08-15

## 2024-08-15 NOTE — TELEPHONE ENCOUNTER
Rx Refill Note  Requested Prescriptions     Pending Prescriptions Disp Refills    levothyroxine (SYNTHROID, LEVOTHROID) 112 MCG tablet [Pharmacy Med Name: LEVOTHYROXIN 112MCG] 90 tablet 2     Sig: Take 1 tablet by mouth Daily.    montelukast (SINGULAIR) 10 MG tablet [Pharmacy Med Name: MONTELUKAST 10MG] 90 tablet 2     Sig: Take 1 tablet by mouth Daily.      Last office visit with prescribing clinician: 2/19/2024   Last telemedicine visit with prescribing clinician: Visit date not found   Next office visit with prescribing clinician: Visit date not found                         Would you like a call back once the refill request has been completed: [] Yes [] No    If the office needs to give you a call back, can they leave a voicemail: [] Yes [] No    Rodo Mcdaniel MA  08/15/24, 09:32 EDT

## 2024-08-27 DIAGNOSIS — I50.32 CHRONIC DIASTOLIC (CONGESTIVE) HEART FAILURE: ICD-10-CM

## 2024-08-29 NOTE — TELEPHONE ENCOUNTER
Drug-Drug: furosemide and diclofenac     Rx Refill Note  Requested Prescriptions     Pending Prescriptions Disp Refills    furosemide (LASIX) 20 MG tablet [Pharmacy Med Name: FUROSEMIDE 20MG] 36 tablet 2     Sig: TAKE 1 TABLET BY MOUTH 3 TIMES A WEEK.      Last office visit with prescribing clinician: 2/19/2024   Last telemedicine visit with prescribing clinician: Visit date not found   Next office visit with prescribing clinician: Visit date not found                         Would you like a call back once the refill request has been completed: [] Yes [] No    If the office needs to give you a call back, can they leave a voicemail: [] Yes [] No    Christel Sheppard MA  08/29/24, 11:54 EDT

## 2024-08-30 RX ORDER — FUROSEMIDE 20 MG
TABLET ORAL
Qty: 12 TABLET | Refills: 2 | Status: SHIPPED | OUTPATIENT
Start: 2024-08-30

## 2024-10-14 DIAGNOSIS — I50.32 CHRONIC DIASTOLIC (CONGESTIVE) HEART FAILURE: ICD-10-CM

## 2024-10-14 DIAGNOSIS — M1A.9XX0 CHRONIC GOUT INVOLVING TOE WITHOUT TOPHUS, UNSPECIFIED CAUSE, UNSPECIFIED LATERALITY: ICD-10-CM

## 2024-10-14 DIAGNOSIS — J45.21 MILD INTERMITTENT ASTHMA WITH ACUTE EXACERBATION: ICD-10-CM

## 2024-10-14 RX ORDER — ALBUTEROL SULFATE 90 UG/1
2 INHALANT RESPIRATORY (INHALATION) EVERY 4 HOURS PRN
Qty: 8.5 G | Refills: 3 | Status: SHIPPED | OUTPATIENT
Start: 2024-10-14

## 2024-10-14 NOTE — TELEPHONE ENCOUNTER
Rx Refill Note  Requested Prescriptions     Pending Prescriptions Disp Refills    colchicine 0.6 MG tablet [Pharmacy Med Name: COLCHICINE 0.6MG] 5 tablet 5     Sig: TAKE 1 TABLET BY MOUTH DAILY. TAKE ONLY WHEN YOU ARE HAVING A GOUT FLARE.    furosemide (LASIX) 20 MG tablet [Pharmacy Med Name: FUROSEMIDE 20MG]       Sig: TAKE 1 TABLET BY MOUTH 3 TIMES A WEEK.     Signed Prescriptions Disp Refills    albuterol sulfate  (90 Base) MCG/ACT inhaler 8.5 g 3     Sig: INHALE 2 PUFFS EVERY 4 (FOUR) HOURS AS NEEDED FOR WHEEZING     Authorizing Provider: MUMTAZ BOOTHE     Ordering User: NÉSTOR SHEPPARD      Last office visit with prescribing clinician: 2/19/2024   Last telemedicine visit with prescribing clinician: Visit date not found   Next office visit with prescribing clinician: Visit date not found                         Would you like a call back once the refill request has been completed: [] Yes [] No    If the office needs to give you a call back, can they leave a voicemail: [] Yes [] No    Néstor Sheppard MA  10/14/24, 17:07 EDT

## 2024-10-16 RX ORDER — FUROSEMIDE 20 MG
TABLET ORAL
OUTPATIENT
Start: 2024-10-16

## 2024-10-16 RX ORDER — COLCHICINE 0.6 MG/1
0.6 TABLET ORAL DAILY
Qty: 5 TABLET | Refills: 5 | OUTPATIENT
Start: 2024-10-16

## 2024-11-07 DIAGNOSIS — M17.12 PRIMARY OSTEOARTHRITIS OF LEFT KNEE: ICD-10-CM

## 2024-11-08 DIAGNOSIS — M17.12 PRIMARY OSTEOARTHRITIS OF LEFT KNEE: ICD-10-CM

## 2024-11-08 RX ORDER — DICLOFENAC SODIUM 75 MG/1
75 TABLET, DELAYED RELEASE ORAL 2 TIMES DAILY
Qty: 180 TABLET | Refills: 0 | OUTPATIENT
Start: 2024-11-08

## 2024-11-11 DIAGNOSIS — M17.12 PRIMARY OSTEOARTHRITIS OF LEFT KNEE: ICD-10-CM

## 2024-11-14 RX ORDER — DICLOFENAC SODIUM 75 MG/1
75 TABLET, DELAYED RELEASE ORAL 2 TIMES DAILY
Qty: 180 TABLET | Refills: 1 | OUTPATIENT
Start: 2024-11-14

## 2024-12-09 DIAGNOSIS — F51.01 PRIMARY INSOMNIA: ICD-10-CM

## 2024-12-11 RX ORDER — TRAZODONE HYDROCHLORIDE 50 MG/1
50 TABLET, FILM COATED ORAL NIGHTLY
Qty: 90 TABLET | Refills: 0 | OUTPATIENT
Start: 2024-12-11

## 2025-01-16 DIAGNOSIS — I50.32 CHRONIC DIASTOLIC (CONGESTIVE) HEART FAILURE: ICD-10-CM

## 2025-01-16 RX ORDER — FUROSEMIDE 20 MG/1
TABLET ORAL
Qty: 12 TABLET | Refills: 1 | OUTPATIENT
Start: 2025-01-16

## 2025-02-20 ENCOUNTER — TELEPHONE (OUTPATIENT)
Dept: INTERNAL MEDICINE | Facility: CLINIC | Age: 75
End: 2025-02-20
Payer: MEDICARE

## 2025-02-20 NOTE — TELEPHONE ENCOUNTER
Caller: Judith Tenorio    Relationship to patient: Self    Best call back number: [phone]      Patient is needing:   PATIENT IS REQUESTING A CALL BACK REGARDING HER DIABETIC SHOTS.     PLEASE CALL TO DISCUSS.

## 2025-02-24 DIAGNOSIS — J45.21 MILD INTERMITTENT ASTHMA WITH ACUTE EXACERBATION: ICD-10-CM

## 2025-02-26 NOTE — TELEPHONE ENCOUNTER
During conversation, patient realized she called the wrong office. She switched provider's recently to a provider in [place], [place]. She moved in with her daughter.

## 2025-02-28 RX ORDER — ALBUTEROL SULFATE 0.83 MG/ML
SOLUTION RESPIRATORY (INHALATION)
Qty: 225 ML | Refills: 11 | OUTPATIENT
Start: 2025-02-28

## 2025-06-11 DIAGNOSIS — I50.32 CHRONIC DIASTOLIC (CONGESTIVE) HEART FAILURE: ICD-10-CM

## 2025-06-12 RX ORDER — FUROSEMIDE 20 MG/1
20 TABLET ORAL 3 TIMES WEEKLY
Qty: 12 TABLET | Refills: 1 | OUTPATIENT
Start: 2025-06-13